# Patient Record
Sex: MALE | Race: WHITE
[De-identification: names, ages, dates, MRNs, and addresses within clinical notes are randomized per-mention and may not be internally consistent; named-entity substitution may affect disease eponyms.]

---

## 2018-07-18 ENCOUNTER — HOSPITAL ENCOUNTER (EMERGENCY)
Dept: HOSPITAL 62 - ER | Age: 48
Discharge: HOME | End: 2018-07-18
Payer: OTHER GOVERNMENT

## 2018-07-18 VITALS — SYSTOLIC BLOOD PRESSURE: 134 MMHG | DIASTOLIC BLOOD PRESSURE: 82 MMHG

## 2018-07-18 DIAGNOSIS — R07.9: Primary | ICD-10-CM

## 2018-07-18 DIAGNOSIS — Z86.73: ICD-10-CM

## 2018-07-18 DIAGNOSIS — R53.83: ICD-10-CM

## 2018-07-18 DIAGNOSIS — R10.12: ICD-10-CM

## 2018-07-18 DIAGNOSIS — R11.2: ICD-10-CM

## 2018-07-18 DIAGNOSIS — E78.00: ICD-10-CM

## 2018-07-18 DIAGNOSIS — I10: ICD-10-CM

## 2018-07-18 LAB
ADD MANUAL DIFF: NO
ALBUMIN SERPL-MCNC: 4.4 G/DL (ref 3.5–5)
ALP SERPL-CCNC: 54 U/L (ref 38–126)
ALT SERPL-CCNC: 91 U/L (ref 21–72)
ANION GAP SERPL CALC-SCNC: 15 MMOL/L (ref 5–19)
AST SERPL-CCNC: 42 U/L (ref 17–59)
BASOPHILS # BLD AUTO: 0.1 10^3/UL (ref 0–0.2)
BASOPHILS NFR BLD AUTO: 0.8 % (ref 0–2)
BILIRUB DIRECT SERPL-MCNC: 0.3 MG/DL (ref 0–0.4)
BILIRUB SERPL-MCNC: 0.5 MG/DL (ref 0.2–1.3)
BUN SERPL-MCNC: 17 MG/DL (ref 7–20)
CALCIUM: 9.4 MG/DL (ref 8.4–10.2)
CHLORIDE SERPL-SCNC: 107 MMOL/L (ref 98–107)
CK MB SERPL-MCNC: 0.93 NG/ML (ref ?–4.55)
CK SERPL-CCNC: 270 U/L (ref 55–170)
CO2 SERPL-SCNC: 22 MMOL/L (ref 22–30)
EOSINOPHIL # BLD AUTO: 0.3 10^3/UL (ref 0–0.6)
EOSINOPHIL NFR BLD AUTO: 3 % (ref 0–6)
ERYTHROCYTE [DISTWIDTH] IN BLOOD BY AUTOMATED COUNT: 13.3 % (ref 11.5–14)
GLUCOSE SERPL-MCNC: 108 MG/DL (ref 75–110)
HCT VFR BLD CALC: 45.4 % (ref 37.9–51)
HGB BLD-MCNC: 16 G/DL (ref 13.5–17)
LIPASE SERPL-CCNC: 99 U/L (ref 23–300)
LYMPHOCYTES # BLD AUTO: 2.9 10^3/UL (ref 0.5–4.7)
LYMPHOCYTES NFR BLD AUTO: 33.3 % (ref 13–45)
MCH RBC QN AUTO: 31.7 PG (ref 27–33.4)
MCHC RBC AUTO-ENTMCNC: 35.2 G/DL (ref 32–36)
MCV RBC AUTO: 90 FL (ref 80–97)
MONOCYTES # BLD AUTO: 0.9 10^3/UL (ref 0.1–1.4)
MONOCYTES NFR BLD AUTO: 10.2 % (ref 3–13)
NEUTROPHILS # BLD AUTO: 4.5 10^3/UL (ref 1.7–8.2)
NEUTS SEG NFR BLD AUTO: 52.7 % (ref 42–78)
NT PRO BNP: 49 PG/ML (ref ?–125)
PLATELET # BLD: 343 10^3/UL (ref 150–450)
POTASSIUM SERPL-SCNC: 4.4 MMOL/L (ref 3.6–5)
PROT SERPL-MCNC: 7.6 G/DL (ref 6.3–8.2)
RBC # BLD AUTO: 5.04 10^6/UL (ref 4.35–5.55)
SODIUM SERPL-SCNC: 143.7 MMOL/L (ref 137–145)
TOTAL CELLS COUNTED % (AUTO): 100 %
TROPONIN I SERPL-MCNC: < 0.012 NG/ML
WBC # BLD AUTO: 8.6 10^3/UL (ref 4–10.5)

## 2018-07-18 PROCEDURE — 85025 COMPLETE CBC W/AUTO DIFF WBC: CPT

## 2018-07-18 PROCEDURE — 85379 FIBRIN DEGRADATION QUANT: CPT

## 2018-07-18 PROCEDURE — 84443 ASSAY THYROID STIM HORMONE: CPT

## 2018-07-18 PROCEDURE — 93005 ELECTROCARDIOGRAM TRACING: CPT

## 2018-07-18 PROCEDURE — 80053 COMPREHEN METABOLIC PANEL: CPT

## 2018-07-18 PROCEDURE — 83690 ASSAY OF LIPASE: CPT

## 2018-07-18 PROCEDURE — 99285 EMERGENCY DEPT VISIT HI MDM: CPT

## 2018-07-18 PROCEDURE — 96361 HYDRATE IV INFUSION ADD-ON: CPT

## 2018-07-18 PROCEDURE — 83735 ASSAY OF MAGNESIUM: CPT

## 2018-07-18 PROCEDURE — 71046 X-RAY EXAM CHEST 2 VIEWS: CPT

## 2018-07-18 PROCEDURE — 84484 ASSAY OF TROPONIN QUANT: CPT

## 2018-07-18 PROCEDURE — 83880 ASSAY OF NATRIURETIC PEPTIDE: CPT

## 2018-07-18 PROCEDURE — 36415 COLL VENOUS BLD VENIPUNCTURE: CPT

## 2018-07-18 PROCEDURE — 82553 CREATINE MB FRACTION: CPT

## 2018-07-18 PROCEDURE — 82550 ASSAY OF CK (CPK): CPT

## 2018-07-18 PROCEDURE — 96374 THER/PROPH/DIAG INJ IV PUSH: CPT

## 2018-07-18 PROCEDURE — 93010 ELECTROCARDIOGRAM REPORT: CPT

## 2018-07-18 NOTE — ER DOCUMENT REPORT
ED Medical Screen (RME)





- General


Chief Complaint: Chest Pain


Stated Complaint: CHEST PAIN


Time Seen by Provider: 07/18/18 15:37


Mode of Arrival: Wheelchair


Information source: Patient


TRAVEL OUTSIDE OF THE [place] IN LAST 30 DAYS: No





- HPI


Patient complains to provider of: Dizziness, chest pain, nausea, no appetite


Notes: 





07/18/18 15:42


Patient is a 48-year-old male presenting to the emergency room today 

complaining of generalized feeling of unwellness 2 weeks or more, with chest 

pain developing over the past few days, he reports lightheadedness and dizziness

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 







- Related Data


Allergies/Adverse Reactions: 


 





No Known Allergies Allergy (Unverified 07/18/18 14:49)


 











Past Medical History





- Social History


Chew tobacco use (# tins/day): No


Frequency of alcohol use: Rare


Drug Abuse: None





- Past Medical History


Cardiac Medical History: Reports: Hx Hypercholesterolemia, Hx Hypertension


Renal/ Medical History: Denies: Hx Peritoneal Dialysis


Past Surgical History: Reports: Hx Appendectomy





Physical Exam





- Vital signs


Vitals: 





 











Temp Pulse Resp BP Pulse Ox


 


 98.6 F   70   14   144/93 H  96 


 


 07/18/18 14:58  07/18/18 14:58  07/18/18 14:58  07/18/18 14:58  07/18/18 14:58














Course





- Vital Signs


Vital signs: 





 











Temp Pulse Resp BP Pulse Ox


 


 98.6 F   70   14   144/93 H  96 


 


 07/18/18 14:58  07/18/18 14:58  07/18/18 14:58  07/18/18 14:58  07/18/18 14:58

## 2018-07-18 NOTE — ER DOCUMENT REPORT
ED General





- General


Chief Complaint: Chest Pain


Stated Complaint: CHEST PAIN


Time Seen by Provider: 07/18/18 15:37


Mode of Arrival: Wheelchair


Information source: Patient


Notes: 





Patient states that he recently moved here 2 weeks ago and has generally not 

been feeling well for the past 2 weeks.  Patient states that his symptoms seem 

to worsen 4 days ago.  Patient is a  and had to drive out of state, 

unload the truck and then drive all the way back.  Patient states that he has 

had left upper quadrant abdominal pain and left-sided chest pain off and on for 

the past 2 weeks that has been constant but occasionally worsens in intensity.  

Patient does complain of some dizziness with nausea and vomiting 1 episode.  


TRAVEL OUTSIDE OF THE [place] IN LAST 30 DAYS: No





- HPI


Onset: Other - 2 weeks


Onset/Duration: Persistent


Quality of pain: Pressure


Pain Level: 3


Associated symptoms: Chest pain, Nausea, Vomiting, Weakness.  denies: 

Nonproductive cough, Productive cough, Diarrhea, Fever


Exacerbated by: Movement


Relieved by: Denies


Similar symptoms previously: No


Recently seen / treated by doctor: No





- Related Data


Allergies/Adverse Reactions: 


 





No Known Allergies Allergy (Unverified 07/18/18 14:49)


 











Past Medical History





- General


Information source: Patient





- Social History


Smoking Status: Never Smoker


Chew tobacco use (# tins/day): No


Frequency of alcohol use: Rare


Drug Abuse: None


Occupation: 


Lives with: Family


Family History: CAD


Patient has suicidal ideation: No


Patient has homicidal ideation: No





- Past Medical History


Cardiac Medical History: Reports: Hx Hypercholesterolemia, Hx Hypertension


Neurological Medical History: Reports: Hx Cerebrovascular Accident, Other - TIA


Renal/ Medical History: Denies: Hx Peritoneal Dialysis


GI Medical History: Reports: Other - Colitis


Past Surgical History: Reports: Hx Appendectomy





Review of Systems





- Review of Systems


Constitutional: Weakness.  denies: Fever


EENT: No symptoms reported


Cardiovascular: Chest pain, Dizziness


Respiratory: No symptoms reported.  denies: Cough, Short of breath


Gastrointestinal: Abdominal pain, Nausea, Vomiting.  denies: Diarrhea, Poor 

appetite


Genitourinary: No symptoms reported.  denies: Dysuria, Flank pain


Male Genitourinary: No symptoms reported


Musculoskeletal: No symptoms reported.  denies: Back pain


Skin: No symptoms reported


Hematologic/Lymphatic: No symptoms reported


Neurological/Psychological: No symptoms reported.  denies: Lost consciousness, 

Headaches





Physical Exam





- Vital signs


Vitals: 


 











Temp Pulse Resp BP Pulse Ox


 


 98.6 F   70   14   144/93 H  96 


 


 07/18/18 14:58  07/18/18 14:58  07/18/18 14:58  07/18/18 14:58  07/18/18 14:58














- General


General appearance: Appears well, Alert


In distress: None





- HEENT


Head: Normocephalic, Atraumatic


Eyes: Normal


Conjunctiva: Normal


Eyelashes: Normal


Ears: Normal


External canal: Normal


Tympanic membrane: Normal


Nasal: Normal


Mouth/Lips: Normal


Mucous membranes: Normal


Pharynx: Normal.  No: Erythema


Neck: Normal, Supple.  No: Lymphadenopathy





- Respiratory


Respiratory status: No respiratory distress


Chest status: Tender


Breath sounds: Normal


Chest palpation: Tender





- Cardiovascular


Rhythm: Regular


Heart sounds: S1 appreciated, S2 appreciated


Murmur: No





- Abdominal


Inspection: Normal


Distension: No distension


Bowel sounds: Normal


Tenderness: Tender - LUQ


Organomegaly: No organomegaly





- Back


Back: Normal, Nontender.  No: CVA tenderness





- Extremities


General upper extremity: Normal inspection, Normal strength.  No: Edema


General lower extremity: Normal inspection, Normal strength.  No: Edema





- Neurological


Neuro grossly intact: Yes


Cognition: Normal


Ever Coma Scale Eye Opening: Spontaneous


[place] Coma Scale Verbal: Oriented


[place] Coma Scale Motor: Obeys Commands


[place] Coma Scale Total: 15





- Psychological


Associated symptoms: Normal affect, Normal mood





- Skin


Skin Temperature: Warm


Skin Moisture: Dry


Skin Color: Normal





Course





- Re-evaluation


Re-evalutation: 





07/18/18 18:06


Consult with Dr. Dennis regarding patient presentation and diagnostic 

evaluation.  Patient with symptoms that have been constant for the past 2 weeks 

but that worsened in intensity off and on.  Patient with stable vital signs, 

normal sinus rhythm on EKG.  No concern for PE or DVT at this time.  Dr. Dennis recommends treating gastritis symptoms and having patient follow up 

with primary doctor and cardiology an outpatient basis.  The patient has 

atypical chest pain as the patient's chest pain is not suggestive of pulmonary 

embolus, cardiac ischemia, aortic dissection, or other serious etiology.  Given 

the extremely low risk of these diagnoses for the test in evaluation for these 

possibilities does not appear to be indicated at this time.  Patient has been 

instructed to return if the symptoms worsen or change in any way.





- Vital Signs


Vital signs: 


 











Temp Pulse Resp BP Pulse Ox


 


 98.6 F   70   18   134/82 H  96 


 


 07/18/18 14:58  07/18/18 14:58  07/18/18 18:14  07/18/18 18:14  07/18/18 18:14














- Laboratory


Result Diagrams: 


 07/18/18 16:12





 07/18/18 16:12


Laboratory results interpreted by me: 


 











  07/18/18





  16:12


 


ALT  91 H


 


Creatine Kinase  270 H











07/18/18 18:06





 Labs- Entire Visit











  07/18/18 07/18/18 07/18/18





  16:12 16:12 16:12


 


WBC  8.6  


 


RBC  5.04  


 


Hgb  16.0  


 


Hct  45.4  


 


MCV  90  


 


MCH  31.7  


 


MCHC  35.2  


 


RDW  13.3  


 


Plt Count  343  


 


Seg Neutrophils %  52.7  


 


Lymphocytes %  33.3  


 


Monocytes %  10.2  


 


Eosinophils %  3.0  


 


Basophils %  0.8  


 


Absolute Neutrophils  4.5  


 


Absolute Lymphocytes  2.9  


 


Absolute Monocytes  0.9  


 


Absolute Eosinophils  0.3  


 


Absolute Basophils  0.1  


 


D-Dimer   


 


Sodium   143.7 


 


Potassium   4.4 


 


Chloride   107 


 


Carbon Dioxide   22 


 


Anion Gap   15 


 


BUN   17 


 


Creatinine   1.01 


 


Est GFR ( Amer)   > 60 


 


Est GFR (Non-Af Amer)   > 60 


 


Glucose   108 


 


Calcium   9.4 


 


Magnesium   


 


Total Bilirubin   0.5 


 


Direct Bilirubin   0.3 


 


Neonat Total Bilirubin   Not Reportable 


 


Neonat Direct Bilirubin   Not Reportable 


 


Neonat Indirect Bili   Not Reportable 


 


AST   42 


 


ALT   91 H 


 


Alkaline Phosphatase   54 


 


Creatine Kinase   270 H 


 


CK-MB (CK-2)    0.93


 


Troponin I    < 0.012


 


NT-Pro-B Natriuret Pep    49


 


Total Protein   7.6 


 


Albumin   4.4 


 


Lipase   99.0 


 


TSH   














  07/18/18 07/18/18 07/18/18





  16:12 16:12 16:12


 


WBC   


 


RBC   


 


Hgb   


 


Hct   


 


MCV   


 


MCH   


 


MCHC   


 


RDW   


 


Plt Count   


 


Seg Neutrophils %   


 


Lymphocytes %   


 


Monocytes %   


 


Eosinophils %   


 


Basophils %   


 


Absolute Neutrophils   


 


Absolute Lymphocytes   


 


Absolute Monocytes   


 


Absolute Eosinophils   


 


Absolute Basophils   


 


D-Dimer   0.32 


 


Sodium   


 


Potassium   


 


Chloride   


 


Carbon Dioxide   


 


Anion Gap   


 


BUN   


 


Creatinine   


 


Est GFR ( Amer)   


 


Est GFR (Non-Af Amer)   


 


Glucose   


 


Calcium   


 


Magnesium  2.1  


 


Total Bilirubin   


 


Direct Bilirubin   


 


Neonat Total Bilirubin   


 


Neonat Direct Bilirubin   


 


Neonat Indirect Bili   


 


AST   


 


ALT   


 


Alkaline Phosphatase   


 


Creatine Kinase   


 


CK-MB (CK-2)   


 


Troponin I   


 


NT-Pro-B Natriuret Pep   


 


Total Protein   


 


Albumin   


 


Lipase   


 


TSH    4.16














- Diagnostic Test


Radiology reviewed: Reports reviewed





- EKG Interpretation by Me


EKG shows normal: Sinus rhythm


Rate: Normal





Discharge





- Discharge


Clinical Impression: 


Chest pain


Qualifiers:


 Chest pain type: unspecified Qualified Code(s): R07.9 - Chest pain, unspecified





Abdominal pain


Qualifiers:


 Abdominal location: left upper quadrant Qualified Code(s): R10.12 - Left upper 

quadrant pain





Condition: Stable


Disposition: HOME, SELF-CARE


Instructions:  Abdominal Pain ([facility]), Chest Pain of Unclear Cause ([facility]), 

Prilosec (Acid Pump Inhibitor) ([facility])


Additional Instructions: 


Return immediately for any new or worsening symptoms





Followup with your primary care provider, call tomorrow to make a followup 

appointment





Follow-up with a cardiologist for a recheck, call tomorrow for an appointment





Be sure to stay well-hydrated


Prescriptions: 


Omeprazole Magnesium [Prilosec Otc] 20 mg PO DAILY #15 tablet.


Sucralfate [Carafate 1 gm Tablet] 1 gm PO ACHS #40 tablet


Forms:  Return to Work


Referrals: 


LESLYE MOSQUEDA MD [Primary Care Provider] - Follow up as needed


DEEPTI MACK MD [ACTIVE STAFF] - Follow up tomorrow

## 2018-08-01 ENCOUNTER — HOSPITAL ENCOUNTER (OUTPATIENT)
Dept: HOSPITAL 62 - ER | Age: 48
Setting detail: OBSERVATION
LOS: 2 days | Discharge: HOME | End: 2018-08-03
Attending: HOSPITALIST | Admitting: HOSPITALIST
Payer: OTHER GOVERNMENT

## 2018-08-01 DIAGNOSIS — R06.02: ICD-10-CM

## 2018-08-01 DIAGNOSIS — E66.9: ICD-10-CM

## 2018-08-01 DIAGNOSIS — R53.1: ICD-10-CM

## 2018-08-01 DIAGNOSIS — R53.81: ICD-10-CM

## 2018-08-01 DIAGNOSIS — E78.5: ICD-10-CM

## 2018-08-01 DIAGNOSIS — Z82.49: ICD-10-CM

## 2018-08-01 DIAGNOSIS — R61: ICD-10-CM

## 2018-08-01 DIAGNOSIS — I10: ICD-10-CM

## 2018-08-01 DIAGNOSIS — R01.1: ICD-10-CM

## 2018-08-01 DIAGNOSIS — Z79.82: ICD-10-CM

## 2018-08-01 DIAGNOSIS — R07.89: Primary | ICD-10-CM

## 2018-08-01 DIAGNOSIS — Z90.49: ICD-10-CM

## 2018-08-01 DIAGNOSIS — J30.1: ICD-10-CM

## 2018-08-01 DIAGNOSIS — R42: ICD-10-CM

## 2018-08-01 DIAGNOSIS — R00.2: ICD-10-CM

## 2018-08-01 DIAGNOSIS — Z82.3: ICD-10-CM

## 2018-08-01 DIAGNOSIS — R51: ICD-10-CM

## 2018-08-01 DIAGNOSIS — Z79.899: ICD-10-CM

## 2018-08-01 DIAGNOSIS — R00.0: ICD-10-CM

## 2018-08-01 LAB
ADD MANUAL DIFF: NO
ALBUMIN SERPL-MCNC: 4.6 G/DL (ref 3.5–5)
ALP SERPL-CCNC: 53 U/L (ref 38–126)
ALT SERPL-CCNC: 78 U/L (ref 21–72)
ANION GAP SERPL CALC-SCNC: 11 MMOL/L (ref 5–19)
AST SERPL-CCNC: 38 U/L (ref 17–59)
BASOPHILS # BLD AUTO: 0 10^3/UL (ref 0–0.2)
BASOPHILS NFR BLD AUTO: 0.5 % (ref 0–2)
BILIRUB DIRECT SERPL-MCNC: 0.3 MG/DL (ref 0–0.4)
BILIRUB SERPL-MCNC: 0.5 MG/DL (ref 0.2–1.3)
BUN SERPL-MCNC: 16 MG/DL (ref 7–20)
CALCIUM: 10 MG/DL (ref 8.4–10.2)
CHLORIDE SERPL-SCNC: 102 MMOL/L (ref 98–107)
CK MB SERPL-MCNC: 0.78 NG/ML (ref ?–4.55)
CK SERPL-CCNC: 106 U/L (ref 55–170)
CO2 SERPL-SCNC: 29 MMOL/L (ref 22–30)
EOSINOPHIL # BLD AUTO: 0.1 10^3/UL (ref 0–0.6)
EOSINOPHIL NFR BLD AUTO: 1.4 % (ref 0–6)
ERYTHROCYTE [DISTWIDTH] IN BLOOD BY AUTOMATED COUNT: 13.7 % (ref 11.5–14)
FREE T4 (FREE THYROXINE): 0.92 NG/DL (ref 0.78–2.19)
GLUCOSE SERPL-MCNC: 101 MG/DL (ref 75–110)
HCT VFR BLD CALC: 44.5 % (ref 37.9–51)
HGB BLD-MCNC: 15.6 G/DL (ref 13.5–17)
LYMPHOCYTES # BLD AUTO: 2.4 10^3/UL (ref 0.5–4.7)
LYMPHOCYTES NFR BLD AUTO: 30.6 % (ref 13–45)
MCH RBC QN AUTO: 31.3 PG (ref 27–33.4)
MCHC RBC AUTO-ENTMCNC: 35.1 G/DL (ref 32–36)
MCV RBC AUTO: 89 FL (ref 80–97)
MONOCYTES # BLD AUTO: 0.7 10^3/UL (ref 0.1–1.4)
MONOCYTES NFR BLD AUTO: 8.5 % (ref 3–13)
NEUTROPHILS # BLD AUTO: 4.6 10^3/UL (ref 1.7–8.2)
NEUTS SEG NFR BLD AUTO: 59 % (ref 42–78)
PLATELET # BLD: 284 10^3/UL (ref 150–450)
POTASSIUM SERPL-SCNC: 5.2 MMOL/L (ref 3.6–5)
PROT SERPL-MCNC: 7.8 G/DL (ref 6.3–8.2)
RBC # BLD AUTO: 5 10^6/UL (ref 4.35–5.55)
SODIUM SERPL-SCNC: 142.1 MMOL/L (ref 137–145)
T3FREE SERPL-MCNC: 3.75 PG/ML (ref 2.77–5.27)
TOTAL CELLS COUNTED % (AUTO): 100 %
TROPONIN I SERPL-MCNC: < 0.012 NG/ML
WBC # BLD AUTO: 7.8 10^3/UL (ref 4–10.5)

## 2018-08-01 PROCEDURE — 96372 THER/PROPH/DIAG INJ SC/IM: CPT

## 2018-08-01 PROCEDURE — 84443 ASSAY THYROID STIM HORMONE: CPT

## 2018-08-01 PROCEDURE — 93010 ELECTROCARDIOGRAM REPORT: CPT

## 2018-08-01 PROCEDURE — 84439 ASSAY OF FREE THYROXINE: CPT

## 2018-08-01 PROCEDURE — 71275 CT ANGIOGRAPHY CHEST: CPT

## 2018-08-01 PROCEDURE — 82553 CREATINE MB FRACTION: CPT

## 2018-08-01 PROCEDURE — 80061 LIPID PANEL: CPT

## 2018-08-01 PROCEDURE — 70450 CT HEAD/BRAIN W/O DYE: CPT

## 2018-08-01 PROCEDURE — 93017 CV STRESS TEST TRACING ONLY: CPT

## 2018-08-01 PROCEDURE — 36415 COLL VENOUS BLD VENIPUNCTURE: CPT

## 2018-08-01 PROCEDURE — 80048 BASIC METABOLIC PNL TOTAL CA: CPT

## 2018-08-01 PROCEDURE — 80307 DRUG TEST PRSMV CHEM ANLYZR: CPT

## 2018-08-01 PROCEDURE — 93005 ELECTROCARDIOGRAM TRACING: CPT

## 2018-08-01 PROCEDURE — 99285 EMERGENCY DEPT VISIT HI MDM: CPT

## 2018-08-01 PROCEDURE — 81001 URINALYSIS AUTO W/SCOPE: CPT

## 2018-08-01 PROCEDURE — A9500 TC99M SESTAMIBI: HCPCS

## 2018-08-01 PROCEDURE — 71045 X-RAY EXAM CHEST 1 VIEW: CPT

## 2018-08-01 PROCEDURE — 85025 COMPLETE CBC W/AUTO DIFF WBC: CPT

## 2018-08-01 PROCEDURE — 82550 ASSAY OF CK (CPK): CPT

## 2018-08-01 PROCEDURE — G0378 HOSPITAL OBSERVATION PER HR: HCPCS

## 2018-08-01 PROCEDURE — 84481 FREE ASSAY (FT-3): CPT

## 2018-08-01 PROCEDURE — 78452 HT MUSCLE IMAGE SPECT MULT: CPT

## 2018-08-01 PROCEDURE — 80053 COMPREHEN METABOLIC PANEL: CPT

## 2018-08-01 PROCEDURE — 84484 ASSAY OF TROPONIN QUANT: CPT

## 2018-08-01 PROCEDURE — 85027 COMPLETE CBC AUTOMATED: CPT

## 2018-08-01 NOTE — EKG REPORT
SEVERITY:- OTHERWISE NORMAL ECG -

SINUS RHYTHM

RIGHT AXIS DEVIATION

:

Confirmed by: Shaheen Wadsworth 01-Aug-2018 21:59:37

## 2018-08-01 NOTE — ER DOCUMENT REPORT
ED Medical Screen (RME)





- General


Chief Complaint: Chest Pain


Stated Complaint: CHEST PAINS


Time Seen by Provider: 08/01/18 12:49


TRAVEL OUTSIDE OF THE [place] IN LAST 30 DAYS: No





- HPI


Patient complains to provider of: Palpitations chest pain and headache





- Related Data


Allergies/Adverse Reactions: 


 





No Known Allergies Allergy (Unverified 07/18/18 14:49)


 











Past Medical History





- Social History


Cigarette use (# per day): No


Chew tobacco use (# tins/day): No


Frequency of alcohol use: None


Drug Abuse: None





- Past Medical History


Cardiac Medical History: Reports: Hx Hypercholesterolemia, Hx Hypertension


Pulmonary Medical History: Reports: None


Neurological Medical History: Reports: Hx Cerebrovascular Accident


Renal/ Medical History: Reports: None.  Denies: Hx Peritoneal Dialysis


Malignancy Medical History: Reports None


GI Medical History: Reports: None


Musculoskeltal Medical History: Reports None


Past Surgical History: Reports: Hx Appendectomy





Review of Systems





- Review of Systems


Notes: 





Positive palpitations


Positive chest pain


Positive headache


All the systems reviewed and otherwise negative





Physical Exam





- Vital signs


Vitals: 


 











Temp Pulse Resp BP Pulse Ox


 


 98.9 F   111 H  16   153/98 H  95 


 


 08/01/18 12:07  08/01/18 12:07  08/01/18 12:07  08/01/18 12:07  08/01/18 12:07














Course





- Re-evaluation


Re-evalutation: 





08/01/18 13:11


Is a gentleman who presents with recurrent chest pain as well as headache with 

a concerned that something is going on despite having presented last week and 

been discharged with appropriate follow-up.


We will plan to continue cardiac evaluation.  Will draw to troponins will plan 

for monitoring and reassessment is necessary through the main emergency 

department





- Vital Signs


Vital signs: 


 











Temp Pulse Resp BP Pulse Ox


 


 98.9 F   111 H  16   153/98 H  95 


 


 08/01/18 12:07  08/01/18 12:07  08/01/18 12:07 08/01/18 12:07 08/01/18 12:07














Doctor's Discharge





- Discharge


Referrals: 


LESLYE MOSQUEDA MD [Primary Care Provider] - Follow up as needed

## 2018-08-01 NOTE — RADIOLOGY REPORT (SQ)
EXAM DESCRIPTION:  CHEST SINGLE VIEW



COMPLETED DATE/TIME:  8/1/2018 2:43 pm



REASON FOR STUDY:  chest pain



COMPARISON:  7/18/2018.



EXAM PARAMETERS:  NUMBER OF VIEWS: One view.

TECHNIQUE: Single frontal radiographic view of the chest acquired.

RADIATION DOSE: NA

LIMITATIONS: None.



FINDINGS:  LUNGS AND PLEURA: No opacities, masses or pneumothorax. No pleural effusion.

MEDIASTINUM AND HILAR STRUCTURES: No masses.  Contour normal.

HEART AND VASCULAR STRUCTURES: Heart normal in size.  Normal vasculature.

BONES: No acute findings.

HARDWARE: None in the chest.

OTHER: No other significant finding.



IMPRESSION:  NO ACUTE RADIOGRAPHIC FINDING IN THE CHEST.



TECHNICAL DOCUMENTATION:  JOB ID:  [id number]

 2011 [employer]- All Rights Reserved



Reading location - IP/workstation name: [email or web address]

## 2018-08-01 NOTE — ER DOCUMENT REPORT
ED Cardiac





- General


Chief Complaint: Chest Pain


Stated Complaint: CHEST PAINS


Time Seen by Provider: 08/01/18 12:49


Mode of Arrival: Ambulatory


Information source: Patient


TRAVEL OUTSIDE OF THE [place] IN LAST 30 DAYS: No





- HPI


Patient complains to provider of: Chest pain


Use of: denies: Alcohol, Amphetamines, Bath salts, Caffeine, Cocaine, 

Decongestants


Was the onset of pain: Gradual


When did pain begin: 2 WEEKS AGO


Is the pain a: New problem


Chest pain location: Other - L. PECTORAL


Quality of pain: Mild, Heaviness, Pressure, Radiating


Chest pain radiation location: Left arm, Left shoulder, Neck


Severity now: Mild


Severity at worst: Moderate


Chest pain precipitating factors: Physical Exertion


Cardiac risk factors: Hypertension, Dyslipidemia


Positive cardiac history: No


Associated symptoms: Diaphoresis, Dizziness, Shortness of breath, Weakness


Exacerbated by: Activity


Relieved by: Rest


Similar symptoms previously: No


Recently seen / treated by doctor: Yes - LAST WEEK, URG[facility]ARE CL[facility], BP MED 

INCREASED





- Related Data


Allergies/Adverse Reactions: 


 





No Known Allergies Allergy (Unverified 07/18/18 14:49)


 











Past Medical History





- Social History


Smoking Status: Never Smoker


Cigarette use (# per day): No


Chew tobacco use (# tins/day): No


Frequency of alcohol use: None


Drug Abuse: None


Lives with: Family


Family History: CAD


Patient has suicidal ideation: No


Patient has homicidal ideation: No





- Past Medical History


Cardiac Medical History: Reports: Hx Hypercholesterolemia, Hx Hypertension


Pulmonary Medical History: Reports: None


Neurological Medical History: Reports: Hx Cerebrovascular Accident


Renal/ Medical History: Reports: None.  Denies: Hx Peritoneal Dialysis


Malignancy Medical History: Reports None


GI Medical History: Reports: None


Musculoskeletal Medical History: Reports None


Psychiatric Medical History: Reports: None


Past Surgical History: Reports: Hx Appendectomy





Review of Systems





- Review of Systems


Constitutional: See HPI, Malaise, Weakness, Weight gain


EENT: No symptoms reported


Cardiovascular: See HPI


Respiratory: See HPI


Gastrointestinal: No symptoms reported


Genitourinary: No symptoms reported


Musculoskeletal: No symptoms reported


Skin: No symptoms reported


Neurological/Psychological: See HPI





Physical Exam





- Vital signs


Vitals: 


 











Temp Pulse Resp BP Pulse Ox


 


 98.9 F   111 H  16   153/98 H  95 


 


 08/01/18 12:07  08/01/18 12:07  08/01/18 12:07  08/01/18 12:07  08/01/18 12:07











Interpretation: Hypertensive, Tachycardic





- General


General appearance: Appears well, Alert


In distress: None





- HEENT


Head: Normocephalic


Eyes: Normal


Conjunctiva: Normal


Ears: Normal


Nasal: Normal


Mouth/Lips: Normal


Mucous membranes: Normal





- Respiratory


Respiratory status: No respiratory distress


Chest status: Nontender


Breath sounds: Normal





- Cardiovascular


Rhythm: Regular


Heart sounds: Normal auscultation


Murmur: No





- Abdominal


Inspection: Normal


Distension: No distension


Bowel sounds: Normal





- Back


Back: Normal





- Extremities


General upper extremity: Normal inspection


General lower extremity: Normal inspection.  No: Tender, Edema





- Neurological


Neuro grossly intact: Yes


Cognition: Normal


Orientation: AAOx4





- Psychological


Associated symptoms: Normal affect, Normal mood





- Skin


Skin Temperature: Warm


Skin Moisture: Dry


Skin Color: Normal


Skin Turgor: Elastic





Course





- Vital Signs


Vital signs: 


 











Temp Pulse Resp BP Pulse Ox


 


 98.9 F   111 H  20   135/82 H  97 


 


 08/01/18 12:07  08/01/18 12:07  08/01/18 19:01  08/01/18 19:00  08/01/18 19:01














- Laboratory


Result Diagrams: 


 08/01/18 13:10





 08/01/18 13:10


Laboratory results interpreted by me: 


 











  08/01/18 08/01/18





  13:10 13:10


 


Potassium  5.2 H 


 


ALT  78 H 


 


TSH   4.82 H














- Diagnostic Test


Radiology reviewed: Image reviewed, Reports reviewed





- EKG Interpretation by Me


EKG shows normal: Sinus rhythm, Intervals, QRS Complexes, ST-T Waves.  abnormal

: Axis


Rate: Normal


Rhythm: NSR


Axis/QRS: Right axis deviation





- Consults


  ** DR. LONGORIA


Time consulted: 21:10


Consulted provider: will come to ER





Discharge





- Discharge


Clinical Impression: 


Chest pain


Qualifiers:


 Chest pain type: unspecified Qualified Code(s): R07.9 - Chest pain, unspecified





Condition: Good


Disposition: ADMITTED AS OBSERVATION


Admitting Provider: Hospitalist


Unit Admitted: Telemetry


Referrals: 


LESLYE MOSQUEDA MD [Primary Care Provider] - Follow up as needed

## 2018-08-02 LAB
ANION GAP SERPL CALC-SCNC: 12 MMOL/L (ref 5–19)
BARBITURATES UR QL SCN: NEGATIVE
BUN SERPL-MCNC: 20 MG/DL (ref 7–20)
CALCIUM: 9.4 MG/DL (ref 8.4–10.2)
CHLORIDE SERPL-SCNC: 104 MMOL/L (ref 98–107)
CHOLEST SERPL-MCNC: 329.18 MG/DL (ref 0–200)
CK MB SERPL-MCNC: 0.41 NG/ML (ref ?–4.55)
CK MB SERPL-MCNC: 0.52 NG/ML (ref ?–4.55)
CK MB SERPL-MCNC: 0.56 NG/ML (ref ?–4.55)
CK SERPL-CCNC: 69 U/L (ref 55–170)
CO2 SERPL-SCNC: 24 MMOL/L (ref 22–30)
GLUCOSE SERPL-MCNC: 93 MG/DL (ref 75–110)
LDLC SERPL DIRECT ASSAY-MCNC: 221 MG/DL (ref ?–100)
METHADONE UR QL SCN: NEGATIVE
PCP UR QL SCN: NEGATIVE
POTASSIUM SERPL-SCNC: 4.3 MMOL/L (ref 3.6–5)
SODIUM SERPL-SCNC: 139.9 MMOL/L (ref 137–145)
TRIGL SERPL-MCNC: 232 MG/DL (ref ?–150)
TROPONIN I SERPL-MCNC: < 0.012 NG/ML
URINE AMPHETAMINES SCREEN: NEGATIVE
URINE BENZODIAZEPINES SCREEN: NEGATIVE
URINE COCAINE SCREEN: NEGATIVE
URINE MARIJUANA (THC) SCREEN: NEGATIVE
VLDLC SERPL CALC-MCNC: 46.4 MG/DL (ref 10–31)

## 2018-08-02 RX ADMIN — FAMOTIDINE SCH MG: 20 TABLET, FILM COATED ORAL at 21:00

## 2018-08-02 RX ADMIN — Medication SCH ML: at 21:01

## 2018-08-02 RX ADMIN — Medication SCH ML: at 13:34

## 2018-08-02 RX ADMIN — ACETAMINOPHEN PRN MG: 325 TABLET ORAL at 22:18

## 2018-08-02 RX ADMIN — ENOXAPARIN SODIUM SCH MG: 40 INJECTION SUBCUTANEOUS at 10:24

## 2018-08-02 RX ADMIN — Medication SCH ML: at 08:42

## 2018-08-02 RX ADMIN — FAMOTIDINE SCH MG: 20 TABLET, FILM COATED ORAL at 10:25

## 2018-08-02 RX ADMIN — ASPIRIN SCH MG: 325 TABLET, DELAYED RELEASE ORAL at 10:25

## 2018-08-02 NOTE — PDOC H&P
History of Present Illness


Admission Date/PCP: 


  08/01/18 22:04





  LESLYE MOSQUEDA MD





Patient complains of: Chest pain


History of Present Illness: 


CJ HAAS is a 48 year old male with past medical history of hypertension

, dyslipidemia, obesity presents to the emergency room with intermittent chest 

pain for past 1 week.  Patient was seen in the emergency room a few days ago 

and was discharged home.  Patient reports left-sided intermittent chest pain 

with radiation to neck; patient reports pain 6 out of 10 which is sharp.  

Patient denies associated nausea or dizziness or shortness of breath or 

palpitation.  Patient reports that he had a cardiac stress test done many years 

ago.  Patient denies fever or chills or cough or leg pain or swelling or recent 

travel or sick contact.





On arrival to emergency room his vitals were stable.  EKG showed normal sinus 

rhythm without ST-T changes.  Troponin was negative.  Chest x-ray was 

unremarkable.  Patient was treated with aspirin and nitroglycerin in the 

emergency room.  Patient is currently chest pain-free.  Patient was referred to 

hospital service for admission.








Past Medical History


Cardiac Medical History: Reports: Hyperlipidema, Hypertension


Pulmonary Medical History: Reports: None


Renal/ Medical History: Reports: None


Malignancy Medical History: Reports: None


GI Medical History: Reports: None


Musculoskeltal Medical History: Reports: None


Psychiatric Medical History: Reports: None





Past Surgical History


Past Surgical History: Reports: Appendectomy





Social History


Information Source: Patient


Lives with: Family


Smoking Status: Never Smoker


Frequency of Alcohol Use: Occasional


Hx Recreational Drug Use: No





Family History


Family History: CAD


Parental Family History Reviewed: No


Children Family History Reviewed: No


Sibling(s) Family History Reviewed.: No





Medication/Allergy


Home Medications: 








Metoprolol Hernandez/Hydrochlorothiaz [Metoprolol ER-Hctz 25-12.5 mg] 1 tab PO BID 07/ 18/18 


Omeprazole Magnesium [Prilosec Otc] 20 mg PO DAILY #15 tablet. 07/18/18 


Sucralfate [Carafate 1 gm Tablet] 1 gm PO ACHS #40 tablet 07/18/18 








Allergies/Adverse Reactions: 


 





No Known Allergies Allergy (Unverified 07/18/18 14:49)


 











Review of Systems


All systems: reviewed and no additional remarkable complaints except as stated





Physical Exam


Vital Signs: 


 











Temp Pulse Resp BP Pulse Ox


 


 98.9 F   111 H  20   135/82 H  97 


 


 08/01/18 12:07  08/01/18 12:07  08/01/18 19:01  08/01/18 19:00  08/01/18 19:01











General appearance: PRESENT: no acute distress, cooperative, well-developed, 

well-nourished


Head exam: PRESENT: atraumatic, normocephalic


Eye exam: ABSENT: conjunctival injection, conjunctiva pink, conjunctiva pale, 

EOMI, nystagmus, periorbital swelling, PERRLA, scleral icterus, other


Ear exam: ABSENT: bleeding, drainage, normal external ear exam, TM's normal 

bilaterally, other


Mouth exam: PRESENT: moist, neck supple


Neck exam: ABSENT: carotid bruit, JVD, thyromegaly


Respiratory exam: PRESENT: clear to auscultation rogelio.  ABSENT: rhonchi, wheezes


Cardiovascular exam: PRESENT: RRR, +S1, +S2.  ABSENT: gallop, rubs, systolic 

murmur


GI/Abdominal exam: PRESENT: normal bowel sounds, soft.  ABSENT: organolmegaly, 

tenderness


Rectal exam: PRESENT: deferred


Gentrourinary exam: ABSENT: ecchymosis, erythema, lacerations, lesions, scrotal 

swelling, testicular tenderness, urethral discharge, indwelling catheter, other


Extremities exam: ABSENT: calf tenderness, clubbing, full ROM, joint swelling, 

pedal edema, tenderness, +1 edema, +2 edema, other


Musculoskeletal exam: PRESENT: ambulatory


Neurological exam: PRESENT: alert, altered, awake, oriented to person, oriented 

to place, oriented to time, oriented to situation.  ABSENT: motor sensory 

deficit


Psychiatric exam: ABSENT: suicidal ideation


Skin exam: ABSENT: rash





Results


Laboratory Results: 


 











  08/01/18





  23:40


 


Creatine Kinase  80








labs reviewed


EKG Comments: 


 NSR with no significant ST-T changes.  Personally reviewed by me.





Impressions: 


 





Chest X-Ray  08/01/18 13:08


IMPRESSION:  NO ACUTE RADIOGRAPHIC FINDING IN THE CHEST.


 











Status: Image reviewed by me





Assessment & Plan





- Diagnosis


(1) Chest pain


Qualifiers: 


   Chest pain type: unspecified   Qualified Code(s): R07.9 - Chest pain, 

unspecified   


Is this a current diagnosis for this admission?: Yes   


Plan: 


Patient with atypical chest pain.  Patient will be admitted under observation 

status to rule out ACS.  Will continue serial cardiac enzymes.  We will 

continue aspirin and nitroglycerin as needed.  Will check lipid panel.  

Consider cardiology service.  Possible stress test.  We will keep patient 

n.p.o. after midnight.  Will check urine toxicology.








(2) HTN (hypertension), benign


Is this a current diagnosis for this admission?: No   


Plan: 


Resume current home medication.. Heart pressure stable.








(3) Dyslipidemia


Is this a current diagnosis for this admission?: No   





- Time


Time Spent: 30 to 50 Minutes





- Inpatient Certification


Based on my medical assessment, after consideration of the patient's 

comorbidities, presenting symptoms, or acuity I expect that the services needed 

warrant INPATIENT care.: No


I certify that my determination is in accordance with my understanding of 

Medicare's requirements for reasonable and necessary INPATIENT services [42 CFR 

412.3e].: No

## 2018-08-02 NOTE — PDOC PROGRESS REPORT
Subjective


Progress Note for:: 08/02/18


Subjective:: 





Patient is 48 years old white male with history of hypertension dyslipidemia.  

He was admitted for atypical chest pain troponin x-ray and EKG so far 

unremarkable.  Due to tachycardia cardiology recommended CT angiogram of the 

chest which was negative for PE.  He is currently more comfortable


Reason For Visit: 


CHEST PAIN








Physical Exam


Vital Signs: 


 











Temp Pulse Resp BP Pulse Ox


 


 98.9 F   111 H  14   114/70   91 L


 


 08/01/18 12:07  08/01/18 12:07  08/02/18 16:01  08/02/18 16:01  08/02/18 16:01











Exam: 








Patient no acute distress


Alert oriented to time place person


No anxiety or depression


Head atraumatic normocephalic


Pupils are equal reactive


Regular rate and rhythm


Lungs clear no distress


Abdomen nontender nondistended


Neurological exam unremarkable





Results


Laboratory Results: 


 





 08/02/18 05:25 





 











  08/02/18 08/02/18





  05:25 05:25


 


Sodium   139.9


 


Potassium   4.3


 


Chloride   104


 


Carbon Dioxide   24


 


Anion Gap   12


 


BUN   20


 


Creatinine   0.99


 


Est GFR ( Amer)   > 60


 


Est GFR (Non-Af Amer)   > 60


 


Glucose   93


 


Calcium   9.4


 


Triglycerides  232 H 


 


Cholesterol  329.18 H 


 


LDL Cholesterol Direct  221 H 


 


VLDL Cholesterol  46.4 H 


 


HDL Cholesterol  42 








 











  08/01/18 08/01/18 08/02/18





  23:40 23:40 05:25


 


Creatine Kinase  80   69


 


CK-MB (CK-2)   0.56 


 


Troponin I   < 0.012 














  08/02/18 08/02/18 08/02/18





  05:25 11:30 11:30


 


Creatine Kinase   60 


 


CK-MB (CK-2)  0.52   0.41


 


Troponin I  < 0.012   < 0.012











Impressions: 


 





Chest X-Ray  08/01/18 13:08


IMPRESSION:  NO ACUTE RADIOGRAPHIC FINDING IN THE CHEST.


 








Chest/Abdomen CTA  08/02/18 00:00


IMPRESSION:  No evidence for pulmonary embolic disease.  No acute 

consolidations or pleural effusions are identified.  Linear densities are 

identified in the right lung base which could represent subsegmental 

atelectasis or scarring.  Other findings as noted above


 














Assessment & Plan





- Diagnosis


(1) Chest pain


Qualifiers: 


   Chest pain type: unspecified   Qualified Code(s): R07.9 - Chest pain, 

unspecified   


Is this a current diagnosis for this admission?: Yes   


Plan: 


Troponin, EKG, CT angiogram of the chest negative


Cardiology recommends stress test in the morning








(2) Dyslipidemia


Is this a current diagnosis for this admission?: No   





(3) HTN (hypertension), benign


Is this a current diagnosis for this admission?: No

## 2018-08-02 NOTE — RADIOLOGY REPORT (SQ)
EXAM DESCRIPTION:  CTA CHEST



COMPLETED DATE/TIME:  8/2/2018 2:00 pm



REASON FOR STUDY:  CHEST PAIN : Assess PE and AO dissection



COMPARISON:  Chest x-ray dated 7/18/2018



TECHNIQUE:  CT scan of the chest performed using helical scanning technique with dynamic intravenous 
contrast injection.  Images reviewed with lung, soft tissue and bone windows.  Reconstructed coronal 
and sagittal MPR images reviewed.

Additional 3 dimensional post-processing performed to develop Maximal Intensity Projection images (MI
P).  All images stored on PACS.

All CT scanners at this facility use dose modulation, iterative reconstruction, and/or weight based d
osing when appropriate to reduce radiation dose to as low as reasonably achievable (ALARA).

[facility]: Dose Right  [facility]: CareDose    [facility]: Dose Right    [facility]: Teradose 4D    [facility]: Smart Technologies



CONTRAST TYPE AND DOSE:  contrast/concentration: Isovue 370.00 mg/ml; Total Contrast Delivered: 81.0 
ml; Total Saline Delivered: 80.0 ml

Contrast bolus optimized for the pulmonary arteries. Not diagnostic for the aorta.



RENAL FUNCTION:  None required. The patient is less than 50 years old.



RADIATION DOSE:  CT Rad equipment meets quality standard of care and radiation dose reduction techniq
ues were employed. CTDIvol: 6.6 - 20.1 mGy. DLP: 824 mGy-cm. .



LIMITATIONS:  None.



FINDINGS:  LUNGS AND PLEURA: No masses, infiltrates, or pneumothorax.  No pleural effusions or pleura
l calcifications.  Linear densities are identified in the right lung base which could represent subse
gmental atelectasis or scarring.

AORTA AND GREAT VESSELS: No aneurysm.  Contrast bolus not optimized for the aorta.

HEART: No pericardial effusion. No significant coronary artery calcifications.

PULMONARY ARTERIES: No emboli visualized in the main pulmonary arteries or the segmental branches.

HILAR AND MEDIASTINAL STRUCTURES: No identified masses or abnormal nodes.

HARDWARE: None in the chest.

UPPER ABDOMEN: There is diffuse fatty infiltration of the liver.

THYROID AND OTHER SOFT TISSUES: No masses.  No adenopathy.

BONES: No acute or significant finding.

3D MIPS: Confirm above findings.

OTHER: No other significant finding.



IMPRESSION:  No evidence for pulmonary embolic disease.  No acute consolidations or pleural effusions
 are identified.  Linear densities are identified in the right lung base which could represent subseg
mental atelectasis or scarring.  Other findings as noted above



COMMENT:  Quality ID # 436: Final reports with documentation of one or more dose reduction techniques
 (e.g., Automated exposure control, adjustment of the mA and/or kV according to patient size, use of 
iterative reconstruction technique)



TECHNICAL DOCUMENTATION:  JOB ID:  [id number]

 2011 [employer]- All Rights Reserved



Reading location - IP/workstation name: [email or web address]

## 2018-08-03 VITALS — DIASTOLIC BLOOD PRESSURE: 86 MMHG | SYSTOLIC BLOOD PRESSURE: 139 MMHG

## 2018-08-03 LAB
APPEARANCE UR: CLEAR
APTT PPP: YELLOW S
BILIRUB UR QL STRIP: NEGATIVE
ERYTHROCYTE [DISTWIDTH] IN BLOOD BY AUTOMATED COUNT: 13.6 % (ref 11.5–14)
GLUCOSE UR STRIP-MCNC: NEGATIVE MG/DL
HCT VFR BLD CALC: 41.4 % (ref 37.9–51)
HGB BLD-MCNC: 14.4 G/DL (ref 13.5–17)
KETONES UR STRIP-MCNC: NEGATIVE MG/DL
MCH RBC QN AUTO: 31.2 PG (ref 27–33.4)
MCHC RBC AUTO-ENTMCNC: 34.9 G/DL (ref 32–36)
MCV RBC AUTO: 90 FL (ref 80–97)
NITRITE UR QL STRIP: NEGATIVE
PH UR STRIP: 7 [PH] (ref 5–9)
PLATELET # BLD: 272 10^3/UL (ref 150–450)
PROT UR STRIP-MCNC: NEGATIVE MG/DL
RBC # BLD AUTO: 4.62 10^6/UL (ref 4.35–5.55)
SP GR UR STRIP: 1.02
UROBILINOGEN UR-MCNC: NEGATIVE MG/DL (ref ?–2)
WBC # BLD AUTO: 6.8 10^3/UL (ref 4–10.5)

## 2018-08-03 RX ADMIN — Medication SCH: at 05:26

## 2018-08-03 RX ADMIN — ENOXAPARIN SODIUM SCH MG: 40 INJECTION SUBCUTANEOUS at 12:16

## 2018-08-03 RX ADMIN — FAMOTIDINE SCH MG: 20 TABLET, FILM COATED ORAL at 12:12

## 2018-08-03 RX ADMIN — Medication SCH ML: at 14:26

## 2018-08-03 RX ADMIN — ASPIRIN SCH MG: 325 TABLET, DELAYED RELEASE ORAL at 12:11

## 2018-08-03 RX ADMIN — ACETAMINOPHEN PRN MG: 325 TABLET ORAL at 17:26

## 2018-08-03 NOTE — CONSULTATION REPORT E
Consultation Report



NAME: CJ HAAS

MRN:  [id number]               : 1970      AGE: 48Y

DATE: 2018    413  A



TO:   TRISHA LARSON M.D.



FROM: RADHA PENA M.D.

      Requesting Physician



REASON FOR CONSULTATION:

Chest pain.



HISTORY OF PRESENT ILLNESS:

The patient is a 48-year-old  male with a history of

hypertension, hyperlipidemia, obesity, and also feels to have anxiety,

which is untreated, states that since the past few days he has been having

chest pain for a week.  The pain is in the left front of the chest and is

sharp in nature and is continuous and lasts for a half an hour to 1 hour. 

It is not increased by movements of chest wall or by pressing on the chest

wall or with exertion.  He does feel shortness of breath when he has this.

He denies any palpitations or syncope.  He was seen about a week ago in

the emergency room and was diagnosed as noncardiac.  He states that the

pain radiates to his neck and his arm.  He has no PND, orthopnea, or

palpitations or syncope.  There is no leg edema.



PAST MEDICAL HISTORY:

He has a past medical history of hypertension and history of

hyperlipidemia.  He has no history of asthma or COPD.  He has nasal

allergies and he uses Flonase.  He has no history of diabetes mellitus or

thyroid disease.  He seems to have anxiety, which is untreated.  He has no

history of coronary artery disease or MI or congestive heart failure. 

There is no cardiac arrhythmia.  There is no history of congenital heart

disease.



PAST SURGICAL HISTORY:

Positive for appendectomy.



FAMILY HISTORY:

Positive for coronary artery disease in his grandfather.



SOCIAL HISTORY:

The patient does not smoke.  There is no history of ETOH abuse.



DISPOSITION:

The patient is a [employer].  His father is the surrogate healthcare

decision maker.



ALLERGIES:

No known allergies.



MEDICATIONS:

1.  Tylenol 650 mg p.o. q. 6 hours p.r.n.

2.  Aspirin 325 mg p.o. x1 and 325 mg p.o. daily.

3.  Lipitor 80 mg p.o. daily at bedtime.

4.  Lovenox 40 mg subcutaneously daily.

5.  Pepcid 20 mg p.o. daily q. 12 hours.

6.  Metoprolol tartrate 25 mg p.o. x1.

7.  Nitroglycerin ointment 1 g topically x1.

8.  Oxycodone/acetaminophen 2 tablets p.o. x1.



REVIEW OF SYSTEMS:

CONSTITUTIONAL:  Denies any fever, chills, or rigors.  Denies any

generalized fatigue or weakness.



HEAD:  Denies any history of head injury.  He has a past history of

dizziness, but no recent symptoms of dizziness.



EYES:  No history of amblyopia or diplopia.  No history of amaurosis

fugax.



EARS:  No history of hearing loss.  No history of tinnitus.  No history of

recurrent ear infections.



NOSE:  History of nasal allergies.  He is on Flonase.  No history of

nosebleeds.  No history of nasal polyps.



MOUTH:  No altered taste sensation.  No ulcers in the mouth.



THROAT:  No odynophagia or dysphagia.  No recurrent sore throats.



SKIN:  No bruises.  No yellowish discoloration of the skin.  No eczema.



NECK:  No symptoms of C-spine arthritis.  No swelling in the neck.  No

goiter.



LUNGS:  No history of asthma or COPD.  No history of cough or sputum

production.  No history of pleuritic chest pain, although the patient

states that his left-sided chest pain is sharp in nature.  It is not

pleuritic.  He has no cough.  There is no wheezing.  There is no history

of asthma or COPD.  No history of sleep apnea.  No history of pulmonary

embolism.  No history of hemoptysis.



CARDIAC:  History of hypertension.  No history of congenital heart

disease.  No history of congestive heart failure.  No history of cardiac

arrhythmia.  No syncope.  Noncardiac chest pain as mentioned earlier.  The

etiology seems to be upscale.  There is no history of PND, orthopnea, or

leg edema.  There is no syncope.



GASTROINTESTINAL:  No history of GERD.  No history of peptic ulcer

disease.  No history of fatty food intolerance.  No history of GI bleed. 

No history of hematemesis or melena.  No history of jaundice.  No history

of cirrhosis.  No history of altered bowel movements.



RENAL:  No history of chronic kidney disease.  No symptoms of UTI.  No

hematuria, pyuria, or dysuria.



ENDOCRINE:  No history of diabetes mellitus.  No history of thyroid

disease.



METABOLIC:  History of mild-to-moderate obesity present.  No history of

gout.  History of hyperlipidemia/dyslipidemia present.



MUSCULOSKELETAL:  Denies arthritis or collagen vascular disease.



CENTRAL NERVOUS SYSTEM:  The patient claims that in the past he has had

CVA where he had severe dizzy spells and states that he had a stroke, but

no recent symptoms.  No headaches or migraines.  No seizure disorder.  No

sleep apnea.



PSYCHIATRIC:  The patient denies any treatment or diagnosis of anxiety or

depression, but the patient has multiple complaints suggestive that the

patient might be anxious.



HEMATOLOGICAL:  No history of bleeding diathesis.  No history of clotting

disorders.



VASCULAR:  No history of calf or buttock claudication.  No history of DVT.



PHYSICAL EXAMINATION:

GENERAL:  The patient is mild-to-moderately obese, well groomed, in no

acute distress at present.  At present, he has no chest pain.



VITAL SIGNS:  He is afebrile.  His pulse is 68 beats per minute, regular,

blood pressure is 109/76, respirations are 15 per minute, O2 sats are 96%

on room air.



HEAD:  Atraumatic, normocephalic.



EYES:  Pupils are equal, round, regular, reactive to light and

accommodation.  Extraocular movements are normal.  There is no

conjunctival pallor.  There is no scleral icterus.



EARS:  Tympanic membranes are intact.  External auditory canals are clear.



NOSE:  There is no deviated nasal septum.  There is no inflammation of the

nasal mucous membrane.



MOUTH:  Mucous membranes of the mouth are moist.  Tongue is moist.  There

are no ulcers.  There is no bleeding from the gums.



THROAT:  There is no redness of the oropharynx.  There are no exudates.



SKIN:  There are no skin rashes.  There are no skin lesions.  There is no

petechiae or ecchymosis.



NECK:  Supple.  There is no JVD.  Carotids are equal.  There is no bruit. 

There is no lymphadenopathy.  There is no neck stiffness.  There is no

JVD.  Carotids are equal.  There is no bruit.  There is no goiter. 

Trachea is central.  There are no accessory muscles of respiration in use.



CHEST:  There is no chest wall tenderness.



LUNGS:  Auscultation shows clear lungs without any rhonchi, rales, or

wheezing.



HEART:  S1 and S2 are heard.  There is no S3 gallop.  There is no S4

gallop.  There is a systolic murmur in the left sternal border of the

apex.  There is no rub.



ABDOMEN:  Soft, nontender.  There is no hepatosplenomegaly.  Bowel sounds

are well heard.  There are no tender areas or masses.



EXTREMITIES:  Femorals are well felt.  Leg pulses are well felt.  There

are no femoral bruits.  There is no pedal edema.  There is no DVT or

cellulitis.  There is no calf tenderness.  There is no cyanosis or

clubbing.



CENTRAL NERVOUS SYSTEM:  The patient is conscious, awake, alert, oriented

x3 with no focal deficit.



PSYCHIATRIC:  The patient's judgment and insight are intact, but he

appears to be slightly anxious.



DIAGNOSTIC STUDIES:

The patient's chest x-ray is negative.  The patient's EKG shows sinus

rhythm, rightward axis, otherwise within normal limits.  No acute ischemia

or injury.  The patient's chest, abdomen CTA:  There is diffuse fatty

infiltration of the liver.  Contrast *------* are not optimized for aorta,

but no aneurysm seen.  No pulmonary emboli.  No masses or infiltrates or

pneumothorax.  No pleural effusions.



The patient's white count is 7800, hemoglobin is 15.6, hematocrit is 44.5,

platelet count is 284,000.  The patient's sodium is 139.9, potassium 4.3,

chloride 104, CO2 is 24.  The patient's BUN is 20, creatinine is 0.99. 

GFR is greater than 60.  His glucose is 93, calcium is 9.4.  His cardiac

enzymes/CPK-MB and troponin I are negative x3.  His HDL is 42, his LDL is

elevated at 221, and his triglycerides are 232.  His TSH was slightly

elevated at 4.82, but his free T4 is normal at 0.92 and his free T3 is

normal at 3.75.



IMPRESSION:

1.  Chest pain, most likely noncardiac.  No evidence of MI.  No acute

changes on the EKG.  In view of the patient's anxiety and the patient's

risk factors for coronary artery disease, mainly his age, hypertension,

hyperlipidemia, and family history of coronary artery disease, would

recommend that the patient have exercise treadmill Cardiolite stress test.

Will arrange for that in the morning.  Continue his current medications,

including aspirin and beta blocker.  Continue statin.

2.  Hypertension.

3.  Dyslipidemia.

4.  Mild obesity.

5.  Probable anxiety.  The patient may benefit from anxiolytic agents, but

will leave this to the primary care physician.



NOTE:  The patient was seen at 10:30.  A total of 55 minutes spent on the

patient with more than 50% of the time spent in direct patient care.  His

medications have been reviewed.  His tests also have been reviewed and his

EKG interpreted by me and discussed with the attending physician.  NOTE: 

Medical decision making is of moderate to high complexity.

 





DICTATING PHYSICIAN: TRISHA LARSON M.D.





[id number]                  DT: 2018    1048

PHY#: [id number]            DD: 2018    2353

ID:   [id number]           JOB#: [id number]      ACCT: [id number]



cc:TRISHA LARSON M.D.

>

## 2018-08-03 NOTE — PDOC DISCHARGE SUMMARY
General





- Admit/Disc Date/PCP


Admission Date/Primary Care Provider: 


  08/01/18 22:04





  LESLYE MOSQUEDA MD





Discharge Date: 08/03/18





- Discharge Diagnosis


(1) Chest pain


Is this a current diagnosis for this admission?: Yes   





(2) Dyslipidemia


Is this a current diagnosis for this admission?: No   





(3) HTN (hypertension), benign


Is this a current diagnosis for this admission?: No   





- Additional Information


Discharge Diet: Cardiac


Discharge Activity: Activity As Tolerated


Home Medications: 








Aspirin [Aspirin 325 mg Tablet] 325 mg PO DAILY 08/02/18 


Fluticasone Propionate [Flonase Nasal Spray 50 Mcg/Spray 16 gm] 2 spray NASL 

DAILY 08/02/18 


Metoprolol Tartrate [Lopressor 25 mg Tablet] 50 mg PO Q12 08/02/18 


Multivitamin [Daily Multiple Vitamin] 1 tab PO DAILY 08/02/18 


Simvastatin [Zocor 20 mg Tablet] 20 mg PO QHS 08/02/18 











History of Present Illness


History of Present Illness: 


CJ HAAS is a 48 year old male


With history of hypertension dyslipidemia obesity presents to the emergency 

room due to intermittent chest pain for about a week.





Hospital Course


Hospital Course: 





Patient was admitted for observation


CT angiogram of the chest was negative


His stress test was negative


He is cleared by cardiology





Physical Exam


Vital Signs: 


 











Temp Pulse Resp BP Pulse Ox


 


 98 F   61   16   119/69   96 


 


 08/03/18 09:00  08/03/18 09:00  08/03/18 09:00  08/03/18 09:00  08/03/18 09:00








 Intake & Output











 08/02/18 08/03/18 08/04/18





 06:59 06:59 06:59


 


Intake Total  266 


 


Output Total  350 


 


Balance  -84 


 


Weight  233 lb 0.458 oz 











General appearance: PRESENT: no acute distress


Head exam: PRESENT: atraumatic


Eye exam: ABSENT: conjunctival injection


Neck exam: ABSENT: JVD


Respiratory exam: PRESENT: clear to auscultation rogelio.  ABSENT: accessory muscle 

use


Cardiovascular exam: PRESENT: RRR


Pulses: PRESENT: normal carotid pulses


GI/Abdominal exam: PRESENT: normal bowel sounds, soft.  ABSENT: distended, 

guarding, mass, organolmegaly, rebound, tenderness


Extremities exam: PRESENT: full ROM.  ABSENT: calf tenderness, clubbing, pedal 

edema


Neurological exam: PRESENT: alert, awake, oriented to person, oriented to place

, oriented to time, oriented to situation, CN II-XII grossly intact.  ABSENT: 

motor sensory deficit





Results


Laboratory Results: 


 





 08/03/18 05:45 





 08/02/18 05:25 





 











  08/03/18





  05:45


 


WBC  6.8


 


RBC  4.62


 


Hgb  14.4


 


Hct  41.4


 


MCV  90


 


MCH  31.2


 


MCHC  34.9


 


RDW  13.6


 


Plt Count  272








 











  08/01/18 08/01/18 08/02/18





  23:40 23:40 05:25


 


Creatine Kinase  80   69


 


CK-MB (CK-2)   0.56 


 


Troponin I   < 0.012 














  08/02/18 08/02/18 08/02/18





  05:25 11:30 11:30


 


Creatine Kinase   60 


 


CK-MB (CK-2)  0.52   0.41


 


Troponin I  < 0.012   < 0.012











Impressions: 


 





Chest X-Ray  08/01/18 13:08


IMPRESSION:  NO ACUTE RADIOGRAPHIC FINDING IN THE CHEST.


 








Chest/Abdomen CTA  08/02/18 00:00


IMPRESSION:  No evidence for pulmonary embolic disease.  No acute 

consolidations or pleural effusions are identified.  Linear densities are 

identified in the right lung base which could represent subsegmental 

atelectasis or scarring.  Other findings as noted above


 














Qualifiers





- *


PATIENT BEING DISCHARGED WITH ANY OF THE FOLLOWING DIAGNOSIS: No

## 2018-08-03 NOTE — RADIOLOGY REPORT (SQ)
EXAM DESCRIPTION:  CT HEAD WITHOUT



COMPLETED DATE/TIME:  8/3/2018 5:11 pm



REASON FOR STUDY:  headaceh and dizziness



COMPARISON:  None.



TECHNIQUE:  Axial images acquired through the brain without intravenous contrast.  Images reviewed wi
th bone, brain and subdural windows.  Additional sagittal and coronal reconstructions were generated.
 Images stored on PACS.

All CT scanners at this facility use dose modulation, iterative reconstruction, and/or weight based d
osing when appropriate to reduce radiation dose to as low as reasonably achievable (ALARA).

[facility]: Dose Right  [facility]: CareDose    [facility]: Dose Right    [facility]: Teradose 4D    [facility]: [employer]



RADIATION DOSE:  CT Rad equipment meets quality standard of care and radiation dose reduction techniq
ues were employed. CTDIvol: 53.2 mGy. DLP: 964 mGy-cm. mGy.



LIMITATIONS:  None.



FINDINGS:  VENTRICLES: Normal size and contour.

CEREBRUM: No masses.  No hemorrhage.  No midline shift.  No evidence for acute infarction. Normal gra
y/white matter differentiation. No areas of low density in the white matter.

CEREBELLUM: No masses.  No hemorrhage.  No alteration of density.  No evidence for acute infarction.

EXTRAAXIAL SPACES: No fluid collections.  No masses.

ORBITS AND GLOBE: No intra- or extraconal masses.  Normal contour of globe without masses.

CALVARIUM: No fracture.

PARANASAL SINUSES: No fluid or mucosal thickening.

SOFT TISSUES: No mass or hematoma.

OTHER: No other significant finding.



IMPRESSION:  NORMAL BRAIN CT WITHOUT CONTRAST.

EVIDENCE OF ACUTE STROKE: NO.



COMMENT:  Quality ID # 436: Final reports with documentation of one or more dose reduction techniques
 (e.g., Automated exposure control, adjustment of the mA and/or kV according to patient size, use of 
iterative reconstruction technique)



TECHNICAL DOCUMENTATION:  JOB ID:  [id number]

 2011 [employer]- All Rights Reserved



Reading location - IP/workstation name: [facility]-[email or web address]

## 2018-08-03 NOTE — PROGRESS NOTE E
Progress Note



NAME: CJ HAAS

MRN: [id number]

: 1970             AGE: 48Y

DATE:  2018                    ROOM: 413



SUBJECTIVE:

The patient denies any further chest pain or discomfort.  He complains of

dizziness.  What he says is that he has periodic episodes of vertigo.  He

has not had this checked.  He also states that he has some problems with

his ears.  He denies any chest pain or discomfort.  There is no shortness

of breath.  There is no PND or orthopnea.  There are no palpitations. 

There is no arrhythmia seen on the monitor.  The patient underwent an

uneventful IV Lexiscan Cardiolite stress test this morning.



OBJECTIVE:

GENERAL:  On examination, the patient is moderately obese, but

well-groomed, in no acute distress.



VITAL SIGNS:  He is afebrile, with a temperature of 98.7 degrees

Fahrenheit.  His pulse is 81 beats per minute.  Blood pressure is 130/74,

respirations are 16 per minute.  O2 sats are 94% on room air.



HEENT:  Head is atraumatic, normocephalic.  Eyes:  Pupils are equal,

round, regular, reactive to light and accommodation.  Extraocular

movements are normal.  There is no conjunctival pallor.  There is no

scleral icterus.  ENT is negative.



NECK:  Supple.  There is no JVD.  Carotids are equal.  There is no bruit. 

There is no lymphadenopathy.  There is no goiter.  Trachea is central.



LUNGS:  Clear to auscultation and percussion, without any chest wall

tenderness.  There are no rhonchi, rales or wheezing.



HEART:  S1, S2 are heard.  There is no S3 gallop.  There is no S4 gallop. 

There is a systolic murmur in the left sternal border, at the apex.  There

is no rub.



ABDOMEN:  Soft, nontender.  There is no hepatosplenomegaly.  Bowel sounds

are well-heard.  There are no tender areas or masses.



EXTREMITIES:  Femorals are well-felt.  Leg pulses are well-felt.  There

are no femoral bruits.  There is no pedal edema.  There is no DVT or

cellulitis.  There is no calf tenderness.  There is no cyanosis or

clubbing.



CNS:  The patient is conscious, awake, alert, oriented x3, with no focal

deficits.



PSYCHIATRIC:  The patient's judgment and insight are intact.  His affect

is normal.  Today, he does not appear to be anxious.



DIAGNOSTICS:

The patient's white count is 6800, hemoglobin is 14.4, hematocrit is 41.4,

platelet count is 272,000.  The patient's IV Lexiscan Cardiolite stress

test shows that there is no reversible ischemia and no evidence of

MI/scar.  This has been discussed with the patient.



IMPRESSION:

1.   CHEST PAIN, NONCARDIAC, WITH NEGATIVE CARDIAC BIOMARKERS, NEGATIVE EKG

     CHANGES AND NEGATIVE IV CARDIOLITE STRESS TEST.

2.   HYPERTENSION.

3.   HYPERLIPIDEMIA.

4.   MILD TO MODERATE OBESITY.

5.   PROBABLE ANXIETY.

6.   VERTIGO.



RECOMMENDATIONS:

Patient reassured about his chest pain that is noncardiac.  Patient

desires followup with me for his hypertension.  Also, I have recommended

that the hospitalist refer him to ENT as an outpatient to have the

etiology of his vertigo be determined to see if this is from his ears.



Note, 40 minutes spent on this patient, with more than 50% of the time

spent on direct patient care.  His medications have been reviewed and

management of the patient discussed with the hospitalist.  Medical

decision-making is of moderate complexity.  Will sign off the case and

will follow the patient up in the office.



Thank you.



DICTATING PHYSICIAN:  TRISHA LARSON M.D.





[id number]                  DT: 2018    2231

MARICHUY#: [id number]            DD: 2018

ID:   [id number]           JOB#: [id number]       ACCT: [id number]



cc:

>

## 2018-08-08 NOTE — DRAGON STRESS TEST REPORT
Intravenous Lexiscan Cardiolite stress test using single photon emmision 
computerized tomography.



Date of procedure: 8/3/2018.  Ordering Provider: Dr. Coco Palma.  
Patient's status: In Patient.



Indication: Chest pain.  Coronary risk factors:



Resting EKG: Sinus Rhythm.  Within Normal Limits.



Stress EKG: No changes of ischemia.



The patient had no chest pain or discomfort, and there were no arrhythmias seen.



Reason for termination: Protocol.



Conclusions: Normal EKG and hemodynamic response to IV Lexiscan.



Nuclear data:

At rest the patient was given 13.84 millicuries of technetium 99m sestamibi 
injected intravenously.  As per protocol rest non gated SPECT images were 
obtained.  Subsequently the patient was given intravenous Lexiscan at a dose of 
0.4 mg in 5 mL intravenously, followed by flush with normal saline.  
Subsequently the stress dose of 44.0 millicuries of technetium 99m sestamibi 
was injected intravenously.  As per protocol stress gated images were obtained. 



Nuclear interpretation: 



Review of images showed that all segments of the myocardium had normal 
perfusion at rest, and normal perfusion post stress with IV Lexiscan.

All segments of the myocardium had normal motion, contraction, and thickening 
by gated study.

T. I D. ratio was normal at 0.99.  Computer read rest, and stress left 
ventricular ejection fraction were 58 %, and 65 %, respectively.   



Conclusion:



1.  There is no scintigraphic evidence of Lexiscan induced myocardial ischemia.

2.  There is no scintigraphic evidence of myocardial infarction/scar.





Recommendations:

Aggressive risk factor modification, and treating the underlying co- 
morbidities.  

[facility]

## 2018-08-13 ENCOUNTER — HOSPITAL ENCOUNTER (EMERGENCY)
Dept: HOSPITAL 62 - ER | Age: 48
Discharge: HOME | End: 2018-08-13
Payer: OTHER GOVERNMENT

## 2018-08-13 VITALS — SYSTOLIC BLOOD PRESSURE: 120 MMHG | DIASTOLIC BLOOD PRESSURE: 83 MMHG

## 2018-08-13 DIAGNOSIS — I10: ICD-10-CM

## 2018-08-13 DIAGNOSIS — R06.02: ICD-10-CM

## 2018-08-13 DIAGNOSIS — Z86.73: ICD-10-CM

## 2018-08-13 DIAGNOSIS — R07.9: ICD-10-CM

## 2018-08-13 DIAGNOSIS — F41.9: Primary | ICD-10-CM

## 2018-08-13 LAB
ADD MANUAL DIFF: NO
ALBUMIN SERPL-MCNC: 4.4 G/DL (ref 3.5–5)
ALP SERPL-CCNC: 61 U/L (ref 38–126)
ALT SERPL-CCNC: 60 U/L (ref 21–72)
ANION GAP SERPL CALC-SCNC: 14 MMOL/L (ref 5–19)
AST SERPL-CCNC: 39 U/L (ref 17–59)
BASOPHILS # BLD AUTO: 0 10^3/UL (ref 0–0.2)
BASOPHILS NFR BLD AUTO: 0.4 % (ref 0–2)
BILIRUB DIRECT SERPL-MCNC: 0.2 MG/DL (ref 0–0.4)
BILIRUB SERPL-MCNC: 0.7 MG/DL (ref 0.2–1.3)
BUN SERPL-MCNC: 23 MG/DL (ref 7–20)
CALCIUM: 9.4 MG/DL (ref 8.4–10.2)
CHLORIDE SERPL-SCNC: 107 MMOL/L (ref 98–107)
CK MB SERPL-MCNC: 1.3 NG/ML (ref ?–4.55)
CK SERPL-CCNC: 443 U/L (ref 55–170)
CO2 SERPL-SCNC: 22 MMOL/L (ref 22–30)
EOSINOPHIL # BLD AUTO: 0.2 10^3/UL (ref 0–0.6)
EOSINOPHIL NFR BLD AUTO: 2.6 % (ref 0–6)
ERYTHROCYTE [DISTWIDTH] IN BLOOD BY AUTOMATED COUNT: 13.5 % (ref 11.5–14)
FREE T4 (FREE THYROXINE): 0.98 NG/DL (ref 0.78–2.19)
GLUCOSE SERPL-MCNC: 105 MG/DL (ref 75–110)
HCT VFR BLD CALC: 42.2 % (ref 37.9–51)
HGB BLD-MCNC: 14.7 G/DL (ref 13.5–17)
LYMPHOCYTES # BLD AUTO: 2.1 10^3/UL (ref 0.5–4.7)
LYMPHOCYTES NFR BLD AUTO: 36.3 % (ref 13–45)
MCH RBC QN AUTO: 30.9 PG (ref 27–33.4)
MCHC RBC AUTO-ENTMCNC: 34.8 G/DL (ref 32–36)
MCV RBC AUTO: 89 FL (ref 80–97)
MONOCYTES # BLD AUTO: 0.5 10^3/UL (ref 0.1–1.4)
MONOCYTES NFR BLD AUTO: 9 % (ref 3–13)
NEUTROPHILS # BLD AUTO: 3.1 10^3/UL (ref 1.7–8.2)
NEUTS SEG NFR BLD AUTO: 51.7 % (ref 42–78)
PLATELET # BLD: 298 10^3/UL (ref 150–450)
POTASSIUM SERPL-SCNC: 4.3 MMOL/L (ref 3.6–5)
PROT SERPL-MCNC: 7.4 G/DL (ref 6.3–8.2)
RBC # BLD AUTO: 4.74 10^6/UL (ref 4.35–5.55)
SODIUM SERPL-SCNC: 142.5 MMOL/L (ref 137–145)
TOTAL CELLS COUNTED % (AUTO): 100 %
TROPONIN I SERPL-MCNC: < 0.012 NG/ML
TSH SERPL-ACNC: 1.48 UIU/ML (ref 0.47–4.68)
WBC # BLD AUTO: 5.9 10^3/UL (ref 4–10.5)

## 2018-08-13 PROCEDURE — 94640 AIRWAY INHALATION TREATMENT: CPT

## 2018-08-13 PROCEDURE — 71046 X-RAY EXAM CHEST 2 VIEWS: CPT

## 2018-08-13 PROCEDURE — 36415 COLL VENOUS BLD VENIPUNCTURE: CPT

## 2018-08-13 PROCEDURE — 93010 ELECTROCARDIOGRAM REPORT: CPT

## 2018-08-13 PROCEDURE — 84443 ASSAY THYROID STIM HORMONE: CPT

## 2018-08-13 PROCEDURE — 84484 ASSAY OF TROPONIN QUANT: CPT

## 2018-08-13 PROCEDURE — 83735 ASSAY OF MAGNESIUM: CPT

## 2018-08-13 PROCEDURE — 84439 ASSAY OF FREE THYROXINE: CPT

## 2018-08-13 PROCEDURE — 85025 COMPLETE CBC W/AUTO DIFF WBC: CPT

## 2018-08-13 PROCEDURE — 82553 CREATINE MB FRACTION: CPT

## 2018-08-13 PROCEDURE — 93005 ELECTROCARDIOGRAM TRACING: CPT

## 2018-08-13 PROCEDURE — 99285 EMERGENCY DEPT VISIT HI MDM: CPT

## 2018-08-13 PROCEDURE — 85379 FIBRIN DEGRADATION QUANT: CPT

## 2018-08-13 PROCEDURE — 83880 ASSAY OF NATRIURETIC PEPTIDE: CPT

## 2018-08-13 PROCEDURE — 80053 COMPREHEN METABOLIC PANEL: CPT

## 2018-08-13 PROCEDURE — 82550 ASSAY OF CK (CPK): CPT

## 2018-08-13 NOTE — ER DOCUMENT REPORT
ED General





- General


Chief Complaint: Chest Pain


Stated Complaint: CHEST PAIN


Time Seen by Provider: 08/13/18 13:19


Mode of Arrival: Ambulatory


Information source: Patient


TRAVEL OUTSIDE OF THE U[place] IN LAST 30 DAYS: No





- HPI


Notes: 





48-year-old male presents to the emergency room for evaluation of shortness of 

breath and chest pain x 1month.  Had a full cardiac workup done on August 2nd 

when he was admitted for same issues, stress test was negative as well as 

echocardiogram.  Patient did have a CTA which was unremarkable, no dissection, 

no PE noted on August 2nd at [facility] when he was admitted for observation.  

cardiology did release from their service patient due to all normal findings.  

Patient states he has had chest pain for the last month, which he states is 

similar instances of chest pain in the similar capacity when he was in [place], 

and he did not have any chest pain when he lived in [place] as well as the 

last.  Patient has a primary care provider which he does see in 2 days, is only 

seen by PCP once.  Denies fevers, chills, palpitations, nausea, vomiting, 

diarrhea, abdominal pain, hematuria,blurred vision, double vision, loss of 

vision, speech changes, LH, dizziness, syncope, headaches, wheezing, ST, URI, 

neck pain, weakness, bowel or bladder dysfunction, saddle anesthesia, numbness 

or tingling in bilateral upper or lower extremities equally, muscle paralysis, 

weakness in bilateral upper or lower extremities equally or rash. Denies IV 

drug use.





- Related Data


Allergies/Adverse Reactions: 


 





No Known Allergies Allergy (Verified 08/13/18 12:41)


 











Past Medical History





- General


Information source: Patient





- Social History


Smoking Status: Never Smoker


Frequency of alcohol use: None


Drug Abuse: None


Family History: CAD


Patient has suicidal ideation: No


Patient has homicidal ideation: No





- Past Medical History


Cardiac Medical History: Reports: Hx Hypercholesterolemia, Hx Hypertension


Neurological Medical History: Reports: Hx Cerebrovascular Accident


Renal/ Medical History: Denies: Hx Peritoneal Dialysis


Psychiatric Medical History: Reports: Hx Depression


Past Surgical History: Reports: Hx Appendectomy





- Immunizations


Hx Pneumococcal Vaccination: 10/01/17





Review of Systems





- Review of Systems


Constitutional: No symptoms reported


EENT: No symptoms reported


Cardiovascular: See HPI


Respiratory: See HPI


Gastrointestinal: No symptoms reported


Genitourinary: No symptoms reported


Male Genitourinary: No symptoms reported


Musculoskeletal: No symptoms reported


Skin: No symptoms reported


Hematologic/Lymphatic: No symptoms reported


Neurological/Psychological: No symptoms reported





Physical Exam





- Vital signs


Vitals: 


 











Temp Pulse Resp BP Pulse Ox


 


 97.4 F   87   18   124/72   99 


 


 08/13/18 12:46  08/13/18 12:46  08/13/18 12:46  08/13/18 12:46  08/13/18 12:46














- Notes


Notes: 





PHYSICAL EXAMINATION:





GENERAL: Well-appearing, well-nourished and in no acute distress.





HEAD: Atraumatic, normocephalic.





EYES: Pupils equal round and reactive to light, extraocular movements intact, 

sclera anicteric, conjunctiva are normal.





ENT: Nares patent, oropharynx clear without exudates.  Moist mucous membranes.





NECK: Normal range of motion, supple without lymphadenopathy





LUNGS: Breath sounds clear to auscultation bilaterally and equal.  No wheezes 

rales or rhonchi.





HEART: Regular rate and rhythm without murmurs





ABDOMEN: Soft, nontender, nondistended abdomen.  No guarding, no rebound.  No 

masses appreciated.





Musculoskeletal: Normal range of motion, no pitting or edema.  No cyanosis.





NEUROLOGICAL: Cranial nerves grossly intact.  Normal speech, normal gait.  

Normal sensory, motor exams 





PSYCH: Normal mood, normal affect.





SKIN: Warm, Dry, normal turgor, no rashes or lesions noted.





Course





- Re-evaluation


Re-evalutation: 





08/13/18 15:25


48-year-old male afebrile vitals stable and in no distress presents for 

evaluation of chest pain and shortness of breath 1 month.  EKG negative for 

STEMI, normal sinus rhythm. cxr needed for any acute findings.  cardiac enzymes 

unremarkable.  CBC and CMP unremarkable.   Patient also reports that he had 

similar occurrences of the symptoms when he was living in [place], which does 

have more humidity.  Questionable whether patient has a touch of a pulmonary 

issues such as asthma.  low suspicion for acute coronary syndrome, pulmonary 

embolism, AAA.  Thoracic aortic dissection, pneumothorax, ruptured esophagus, 

respiratory failure, sepsis or meningitis.  Since pulse ox is 94-93% on room 

air patient's pulse ox on room air is between 92-95% history of asthma COPD.  

Per the PERC rule, PE cannot be ruled out.  D-dimer negative.


HEART Score: 


History chest pain with sob x 1 month.         


ECG NSR, non stemi      


Age: 48      


Risk Factors: Retention, hyperlipidemia, History of a TIA in the right side 

back in 2012.  Non-smoker.  No history of diabetes.  Risk factors are greater 

than 3


Troponin <0.012


CTA and stress test is completed on August 2 which was 11 days ago negative for 

any acute findings.  Patient was released by cardiology   


Total: 2


If HEART score is  3 AND both tronponin measurments are normal, the 30 day risk 

of a major adverse cardiac event (all-cause mortality, myocardia infarction or 

need for coronary revscularization) is < 1% (Sensitivity 100%, %).





Patient given DuoNeb to help assess to see if  this would help with feeling of 

shortness of breath due to possibility of humidity being a triggering factor 

for pulmonary dieases, however, patient states this did not help him.  Patient'

s family at bedside.  patient given Xanax 0.25 p.o to see if anxiety is a 

component that is causing him to have this chest pain and shortness of breath 

due to all cardiac testing being negative. approximately 45 minutes later, 

patient reevaluated he states that his shortness of breath has decreased 

noticeably.  Discussed with patient there is a possibility that he could have 

anxiety and/or depression which can elicited his anxiety.  Discussed with 

patient following up with a therapist as well as his primary care provider for 

further evaluation of this concern.  We will give patient 8 pills of 0.25 po 

Xanax for home as needed for anxiety advised to not drive, drink or operate 

heavy machinery while taking medication as this can cause sedation impairment 

in judgment.  Patient verbalized understanding of these instructions.  Reviewed 

case with Dr. Merlin Beal, ER attending who after reviewing pertinent 

laboratory, diagnostic and clinical findings agree with plan of care having 

patient be discharged home with follow up with pulmonologist therapist as well 

as his primary care provider at [facility].  Patient does have a scheduled primary 

care appointment for August 15th, which is in two days. Chest pain in a patient 

without evidence of cardiac or other serious etiology on workup today. I 

discussed with patient that, based on their age, risk factors and emergency 

department testing today, the likelihood that their symptoms are related to a 

heart attack is very low (estimated risk of heart attack or death over the next 

30 days of less than 1%).  The patient demonstrates decision making capacity 

and has verbalized an understanding of these risks to me. 


Based on this,  the patient has chosen to follow-up as an outpatient. Usual 

chest pain return precautions reviewed. The patient states understanding and 

agreement with this plan.











- Vital Signs


Vital signs: 


 











Temp Pulse Resp BP Pulse Ox


 


 97.4 F   87   13   120/83   95 


 


 08/13/18 12:46  08/13/18 12:46  08/13/18 18:47  08/13/18 18:47  08/13/18 18:47














- Laboratory


Result Diagrams: 


 08/13/18 13:45





 08/13/18 13:45


Laboratory results interpreted by me: 


 











  08/13/18





  13:45


 


BUN  23 H


 


Creatine Kinase  443 H














Discharge





- Discharge


Clinical Impression: 


 Anxiety, SOB (shortness of breath) on exertion





Chest pain


Qualifiers:


 Chest pain type: unspecified Qualified Code(s): R07.9 - Chest pain, unspecified





Condition: Stable


Disposition: HOME, SELF-CARE


Instructions:  Antacid Therapy ([facility]), Anxiety ([facility]), Chest Pain of Unclear 

Cause ([facility])


Additional Instructions: 


He had relief from her shortness of breath with Xanax, anxiety could be the 

cause severe persistent chest edema shortness of breath.  She recently had a 

stress test which was negative as well as a CT angiogram of your chest which 

was negative for PE, aortic aneurysm and your laboratory work today was 

negative for any cardiac event or blood clots.  Chest x-ray was negative today 

for any acute pulmonary issues. due to having history of difficulty with 

breathing and more humid climate, will advise to follow-up with the 

pulmonologist, referral given for a possible pulmonary etiology of shortness of 

breath with Dr. Ebony Valdivia.  do not drive, drink alcohol or operate heavy 

machinery while taking Xanax is this can cause impairment in judgment and 

sedation.  Go to your already scheduled appointment on Wednesday the 15th with 

Dr. Leslye Merrill at [facility] for post ER visit.  If you experience any 

worsening shortness of breath or chest pain





Return immediately for any new or worsening symptoms.





Follow up with primary care provider, call tomorrow to make followup 

appointment.


Prescriptions: 


Alprazolam [Xanax] 0.25 mg PO Q8HP PRN #8 tablet


 PRN Reason: Anxiety


Forms:  Return to Work


Referrals: 


EBONY VALDIVIA MD [ACTIVE STAFF] - Follow up in 3-5 days


FOX ALVARADO PSYD [ALLIED HEALTH PROFESSIONAL] - Follow up in 3-5 days


LESLYE MERRILL MD [Primary Care Provider] - 08/15/18 (as already scheduled 

)

## 2018-08-13 NOTE — ER DOCUMENT REPORT
ED Medical Screen (RME)





- General


Chief Complaint: Chest Pain


Stated Complaint: CHEST PAIN


Time Seen by Provider: 08/13/18 13:19


Notes: 





48-year-old male presents to the ER complaining of chest pain and difficulty 

breathing.  Patient was recently admitted and discharged with a negative stress 

test.  He continues to have chest pain and difficulty breathing.  He states he 

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 

pallets back and forth.  He states he gets extraordinarily short of breath and 

has chest pain when he does this.  He denies any calf pain or leg swelling he 

had a negative CTA of the chest when he was here last time.  Patient denies any 

fever chills cough or sore throat.  Denies calf pain or leg swelling denies 

black bloody or tarry stools.


TRAVEL OUTSIDE OF THE [place] IN LAST 30 DAYS: No





- Related Data


Allergies/Adverse Reactions: 


 





No Known Allergies Allergy (Verified 08/13/18 12:41)


 











Past Medical History





- Past Medical History


Cardiac Medical History: Reports: Hx Hypercholesterolemia, Hx Hypertension


Neurological Medical History: Reports: Hx Cerebrovascular Accident


Renal/ Medical History: Denies: Hx Peritoneal Dialysis


Psychiatric Medical History: Reports: Hx Depression


Past Surgical History: Reports: Hx Appendectomy





- Immunizations


History of Influenza Vaccine for 10/2017 - 3/2018 Season: Yes


Influenza Administration Date for 10/2017 - 3/2018 Season: 10/01/17





Physical Exam





- Vital signs


Vitals: 





 











Temp Pulse Resp BP Pulse Ox


 


 97.4 F   87   18   124/72   99 


 


 08/13/18 12:46  08/13/18 12:46  08/13/18 12:46  08/13/18 12:46  08/13/18 12:46














- Notes


Notes: 





Lungs clear and equal cardiovascular regular rate and rhythm, no peripheral 

edema noted





Course





- Re-evaluation


Re-evalutation: 





08/13/18 13:26


We will begin a cardiac workup here in triage.  Again the patient stated he 

feels no better than when he did when he was admitted initially for chest pain.

  Will check thyroid levels.  Patient may need to have pulmonary function 

testing outpatient.  He has no primary care though he just moved from [place] 

recently.





- Vital Signs


Vital signs: 





 











Temp Pulse Resp BP Pulse Ox


 


 97.4 F   87   18   124/72   99 


 


 08/13/18 12:46  08/13/18 12:46  08/13/18 12:46  08/13/18 12:46  08/13/18 12:46














Doctor's Discharge





- Discharge


Referrals: 


LESLYE MOSQUEDA MD [Primary Care Provider] - Follow up as needed

## 2018-08-13 NOTE — RADIOLOGY REPORT (SQ)
EXAM DESCRIPTION:  CHEST 2 VIEWS



COMPLETED DATE/TIME:  8/13/2018 2:52 pm



REASON FOR STUDY:  cp



COMPARISON:  None.



EXAM PARAMETERS:  NUMBER OF VIEWS: two views

TECHNIQUE: Digital Frontal and Lateral radiographic views of the chest acquired.

RADIATION DOSE: NA

LIMITATIONS: none



FINDINGS:  LUNGS AND PLEURA: No opacities, masses or pneumothorax. No pleural effusion.

MEDIASTINUM AND HILAR STRUCTURES: No masses or contour abnormalities.

HEART AND VASCULAR STRUCTURES: Heart normal size.  No evidence for failure.

BONES: No acute findings.

HARDWARE: None in the chest.

OTHER: No other significant finding.



IMPRESSION:  NO ACUTE RADIOGRAPHIC FINDING IN THE CHEST.



TECHNICAL DOCUMENTATION:  JOB ID:  [id number]

 2011 [employer]- All Rights Reserved



Reading location - IP/workstation name: [email or web address]

## 2019-03-11 ENCOUNTER — HOSPITAL ENCOUNTER (EMERGENCY)
Dept: HOSPITAL 62 - ER | Age: 49
Discharge: HOME | End: 2019-03-11
Payer: COMMERCIAL

## 2019-03-11 VITALS — DIASTOLIC BLOOD PRESSURE: 86 MMHG | SYSTOLIC BLOOD PRESSURE: 145 MMHG

## 2019-03-11 DIAGNOSIS — M54.42: Primary | ICD-10-CM

## 2019-03-11 DIAGNOSIS — I10: ICD-10-CM

## 2019-03-11 PROCEDURE — 96372 THER/PROPH/DIAG INJ SC/IM: CPT

## 2019-03-11 PROCEDURE — 72110 X-RAY EXAM L-2 SPINE 4/>VWS: CPT

## 2019-03-11 PROCEDURE — 99283 EMERGENCY DEPT VISIT LOW MDM: CPT

## 2019-03-11 NOTE — ER DOCUMENT REPORT
HPI





- HPI


Patient complains to provider of: low back pain


Time Seen by Provider: 03/11/19 11:35


Onset: Other - 2 days


Onset/Duration: Worse


Quality of pain: Sharp


Pain Level: 5


Context: 





Patient was attempting to get up into a dump truck 2 days ago and felt a pull in

his low back.  Patient states that he took over-the-counter pain medication and 

this resolved his symptoms.  Patient states that yesterday evening his pain 

increased and he was having difficulty changing positions due to the sharpness 

of the pain.  Patient denies any history of back issues.  Patient denies any 

urinary retention or incontinence.  Patient does state that the pain radiates 

down the posterior aspect of his left leg to the middle thigh area.  Patient 

without any history of fever or IV drug use.


Associated Symptoms: Other - Low back pain


Exacerbated by: Movement


Relieved by: Remaining still


Similar symptoms previously: No


Recently seen / treated by doctor: No





- ROS


ROS below otherwise negative: Yes


Systems Reviewed and Negative: Yes All other systems reviewed and negative





- CONSTITUTIONAL


Constitutional: DENIES: Fever, Chills





- NEURO


Neurology: DENIES: Headache, Weakness





- GASTROINTESTINAL


Gastrointestinal: DENIES: Nausea





- MUSCULOSKELETAL


Musculoskeletal: REPORTS: Extremity pain, Back Pain





- DERM


Skin Color: Normal


Skin Problems: None





Past Medical History





- General


Information source: Patient





- Social History


Smoking Status: Never Smoker


Frequency of alcohol use: None


Drug Abuse: None


Occupation: 


Lives with: Family


Family History: CAD





- Past Medical History


Cardiac Medical History: Reports: Hx Hypercholesterolemia, Hx Hypertension


Neurological Medical History: Reports: Hx Cerebrovascular Accident


Renal/ Medical History: Denies: Hx Peritoneal Dialysis


Psychiatric Medical History: Reports: Hx Depression


Past Surgical History: Reports: Hx Appendectomy, Other - Atypical mole removed





- Immunizations


Hx Pneumococcal Vaccination: 10/01/17





Vertical Provider Document





- CONSTITUTIONAL


Agree With Documented VS: Yes


Exam Limitations: No Limitations


General Appearance: WD/WN


Notes: 





PHYSICAL EXAMINATION:





GENERAL: Well-appearing, well-nourished, appears uncomfortable with position 

changes.





HEAD: Atraumatic, normocephalic.





EYES: sclera clear, anicteric, conjunctiva are normal.





ENT: nares patent, Moist mucous membranes.





NECK: Normal range of motion, supple no lymphadenopathy





LUNGS: respirations unlabored





HEART: Regular rate and rhythm without murmurs 





EXTREMITIES: Normal range of motion, no pitting or edema.  No cyanosis. Gait 

normal, pt ambulates without difficulty





BACK: Left lumbar paraspinal tenderness, lower lumbar midline tenderness, no 

deformities or step-offs.  No CVA tenderness. 





NEUROLOGICAL: Cranial nerves grossly intact.  Normal speech, normal gait.  No 

saddle anesthesia.  No foot drop





PSYCH: Normal mood, normal affect.





SKIN: Warm, Dry, normal turgor, no rashes or lesions noted.











- INFECTION CONTROL


TRAVEL OUTSIDE OF THE [place] IN LAST 30 DAYS: No





Course





- Re-evaluation


Re-evalutation: 





03/11/19 12:49


The patient presents with low back pain without signs of spinal cord 

compression, cauda equina syndrome, infection, aneurysm, or other serious 

etiology.  The patient is neurologically intact.  Given the extremely risk of 

these diagnoses further testing and evaluation for these possibilities does not 

appear to be indicated at this time.  Patient has been instructed to return if 

the symptoms worsen or change in any way.





- Vital Signs


Vital signs: 


                                        











Temp Pulse Resp BP Pulse Ox


 


 98.6 F   84   14   151/96 H  95 


 


 03/11/19 10:55  03/11/19 10:55  03/11/19 10:55  03/11/19 10:55  03/11/19 10:55














- Diagnostic Test


Radiology reviewed: Reports reviewed





Discharge





- Discharge


Clinical Impression: 


Sciatica


Qualifiers:


 Laterality: left Qualified Code(s): M54.32 - Sciatica, left side





Condition: Stable


Disposition: HOME, SELF-CARE


Instructions:  Ice Packs ([facility]), Low Back Pain ([facility]), Oral Narcotic Medication 

([facility]), Sciatica ([facility])


Additional Instructions: 


Return immediately for any new or worsening symptoms





Followup with your primary care provider, call tomorrow to make a followup 

appointment








Prescriptions: 


Metaxalone [Skelaxin 800 mg Tablet] 800 mg PO ASDIR PRN #15 tablet


 PRN Reason: 


Oxycodone HCl/Acetaminophen [Percocet 5-325 mg Tablet] 1 tab PO ASDIR PRN #15 

tablet


 PRN Reason: 


Prednisone [Deltasone 20 mg Tablet] 3 tab PO DAILY 5 Days  tablet


Forms:  Return to Work


Referrals: 


LESLYE MOSQUEDA MD [Primary Care Provider] - Follow up tomorrow

## 2019-03-11 NOTE — RADIOLOGY REPORT (SQ)
EXAM DESCRIPTION:  L SPINE WHOLE



COMPLETED DATE/TIME:  3/11/2019 12:23 pm



REASON FOR STUDY:  low back pain



COMPARISON:  None.



NUMBER OF VIEWS:  Five views including obliques.



TECHNIQUE:  AP, lateral, oblique, and sacral radiographic images acquired of the lumbar spine.



LIMITATIONS:  None.



FINDINGS:  MINERALIZATION: Normal.

SEGMENTATION: Normal.  No transitional anatomy.

ALIGNMENT: Normal.

VERTEBRAE: Maintained height.  No fracture or worrisome bone lesion.

DISCS: Mild disc space narrowing with small osteophytes.

POSTERIOR ELEMENTS: Pedicles and facets are intact.  No pars defect or posterior arch defects.

HARDWARE: None in the spine.

PARASPINAL SOFT TISSUES: Normal.

PELVIS: Intact as visualized. No fractures or worrisome bone lesions. SI joints intact.

OTHER: No other significant finding.



IMPRESSION:  MILD DEGENERATIVE DISC DISEASE.  NO ACUTE FINDINGS.



TECHNICAL DOCUMENTATION:  JOB ID:  [id number]

 2011 [employer]- All Rights Reserved



Reading location - IP/workstation name: [email or web address]

## 2019-04-09 ENCOUNTER — HOSPITAL ENCOUNTER (OUTPATIENT)
Dept: HOSPITAL 62 - RAD | Age: 49
End: 2019-04-09
Attending: PHYSICIAN ASSISTANT
Payer: COMMERCIAL

## 2019-04-09 DIAGNOSIS — R94.5: ICD-10-CM

## 2019-04-09 DIAGNOSIS — K76.0: Primary | ICD-10-CM

## 2019-04-09 PROCEDURE — 93976 VASCULAR STUDY: CPT

## 2019-04-09 PROCEDURE — 76700 US EXAM ABDOM COMPLETE: CPT

## 2019-04-09 NOTE — RADIOLOGY REPORT (SQ)
EXAM DESCRIPTION:  U/S ABDOMEN COMPLETE W/DOPPLER



COMPLETED DATE/TIME:  4/9/2019 11:25 am



REASON FOR STUDY:  ABN LIVER ENZYMES (R94.5) R94.5  ABNORMAL RESULTS OF LIVER FUNCTION STUDIES



COMPARISON:  None.



TECHNIQUE:  Dynamic and static grayscale images acquired of the abdomen and recorded on PACS. Additio
nal selected color Doppler and spectral images recorded.

Note:  Study does not meet criteria for complete doppler/duplex scan



LIMITATIONS:  None.



FINDINGS:  PANCREAS: Poorly seen.

LIVER: Increased echogenicity.  No mass.

LIVER VASCULATURE: Normal directional flow of the main portal vein and hepatic veins.

GALLBLADDER: No stones. Normal wall thickness. No pericholecystic fluid.

ULTRASOUND-DETECTED VEGAS'S SIGN: Negative.

INTRAHEPATIC DUCTS AND COMMON DUCT: CBD and intrahepatic ducts normal caliber. No filling defects.

INFERIOR VENA CAVA: Patent.

AORTA: No aneurysm.

RIGHT KIDNEY:  Normal size, 11 cm.   Normal echogenicity.   No solid or suspicious masses.   No hydro
nephrosis.   No calcifications.

LEFT KIDNEY:  Normal size, 11 cm.   Normal echogenicity.   No solid or suspicious masses.   No hydron
ephrosis.   No calcifications.

SPLEEN: Normal size, 11.1 cm.  No solid masses.

PERITONEAL AND PLEURAL SPACES: No ascites or effusions.

OTHER: No other significant finding.



IMPRESSION:  Fatty infiltration of the liver.



TECHNICAL DOCUMENTATION:  JOB ID:  [id number]

 2011 [employer]- All Rights Reserved



Reading location - IP/workstation name: [email or web address]

## 2019-09-13 ENCOUNTER — HOSPITAL ENCOUNTER (OUTPATIENT)
Dept: HOSPITAL 62 - RAD | Age: 49
End: 2019-09-13
Attending: INTERNAL MEDICINE
Payer: COMMERCIAL

## 2019-09-13 DIAGNOSIS — K76.0: Primary | ICD-10-CM

## 2019-09-13 DIAGNOSIS — R10.13: ICD-10-CM

## 2019-09-13 PROCEDURE — 76705 ECHO EXAM OF ABDOMEN: CPT

## 2019-09-13 NOTE — RADIOLOGY REPORT (SQ)
EXAM DESCRIPTION:  U/S ABDOMEN LIMITED W/O DOP



COMPLETED DATE/TIME:  9/13/2019 8:45 am



REASON FOR STUDY:  EPIGASTRIC PAIN;HEARTBURN R10.13  EPIGASTRIC PAIN R12  HEARTBURN



COMPARISON:  None.



TECHNIQUE:  Dynamic and static grayscale images acquired of the abdomen and recorded on PACS. Additio
nal selected color Doppler and spectral images recorded.



LIMITATIONS:  None.



FINDINGS:  PANCREAS: Unable to visualize the pancreas due to overlying bowel gas.

LIVER: Increased and heterogeneous echotexture.

LIVER VASCULATURE: Normal directional flow of the main portal vein and hepatic veins.

GALLBLADDER: The gallbladder wall measures 2 mm in thickness.  There is no cholelithiasis, sludge or 
pericholecystic fluid.

ULTRASOUND-DETECTED VEGAS'S SIGN: Negative.

INTRAHEPATIC DUCTS AND COMMON DUCT: The common bile duct measures 3 mm in diameter.  There is no dila
tation of the intrahepatic bile ducts.

INFERIOR VENA CAVA: Normal flow.

AORTA: No aneurysm.

RIGHT KIDNEY:  Normal size.  No hydronephrosis.

PERITONEAL AND RIGHT PLEURAL SPACE: No ascites or effusions.

OTHER: No other findings.



IMPRESSION:  1. Increased and heterogeneous echotexture of liver parenchyma consistent with hepatic s
teatosis.

2.  No sonographic abnormality of the gallbladder or biliary ducts.

3.  Unable to visualize the pancreas due to overlying bowel gas.



TECHNICAL DOCUMENTATION:  JOB ID:  [id number]

 2011 [employer]- All Rights Reserved



Reading location - IP/workstation name: [email or web address]

## 2020-07-17 ENCOUNTER — HOSPITAL ENCOUNTER (OUTPATIENT)
Dept: HOSPITAL 62 - WI | Age: 50
End: 2020-07-17
Attending: PHYSICIAN ASSISTANT
Payer: COMMERCIAL

## 2020-07-17 DIAGNOSIS — N64.4: Primary | ICD-10-CM

## 2020-07-17 PROCEDURE — 76642 ULTRASOUND BREAST LIMITED: CPT
